# Patient Record
Sex: FEMALE | Race: WHITE | Employment: FULL TIME | URBAN - METROPOLITAN AREA
[De-identification: names, ages, dates, MRNs, and addresses within clinical notes are randomized per-mention and may not be internally consistent; named-entity substitution may affect disease eponyms.]

---

## 2017-02-09 ENCOUNTER — OFFICE VISIT (OUTPATIENT)
Dept: FAMILY MEDICINE | Facility: CLINIC | Age: 52
End: 2017-02-09
Payer: COMMERCIAL

## 2017-02-09 VITALS
SYSTOLIC BLOOD PRESSURE: 114 MMHG | HEART RATE: 70 BPM | TEMPERATURE: 97.9 F | BODY MASS INDEX: 38.87 KG/M2 | WEIGHT: 198 LBS | HEIGHT: 60 IN | DIASTOLIC BLOOD PRESSURE: 67 MMHG

## 2017-02-09 DIAGNOSIS — N30.00 ACUTE CYSTITIS WITHOUT HEMATURIA: Primary | ICD-10-CM

## 2017-02-09 LAB
ALBUMIN UR-MCNC: NEGATIVE MG/DL
APPEARANCE UR: CLEAR
BACTERIA #/AREA URNS HPF: ABNORMAL /HPF
BILIRUB UR QL STRIP: NEGATIVE
COLOR UR AUTO: YELLOW
GLUCOSE UR STRIP-MCNC: NEGATIVE MG/DL
HGB UR QL STRIP: ABNORMAL
KETONES UR STRIP-MCNC: NEGATIVE MG/DL
LEUKOCYTE ESTERASE UR QL STRIP: ABNORMAL
NITRATE UR QL: POSITIVE
NON-SQ EPI CELLS #/AREA URNS LPF: ABNORMAL /LPF
PH UR STRIP: 6 PH (ref 5–7)
RBC #/AREA URNS AUTO: ABNORMAL /HPF (ref 0–2)
SP GR UR STRIP: 1.02 (ref 1–1.03)
URN SPEC COLLECT METH UR: ABNORMAL
UROBILINOGEN UR STRIP-ACNC: 0.2 EU/DL (ref 0.2–1)
WBC #/AREA URNS AUTO: ABNORMAL /HPF (ref 0–2)

## 2017-02-09 PROCEDURE — 99213 OFFICE O/P EST LOW 20 MIN: CPT | Performed by: FAMILY MEDICINE

## 2017-02-09 PROCEDURE — 87088 URINE BACTERIA CULTURE: CPT | Performed by: FAMILY MEDICINE

## 2017-02-09 PROCEDURE — 81001 URINALYSIS AUTO W/SCOPE: CPT | Performed by: FAMILY MEDICINE

## 2017-02-09 PROCEDURE — 87086 URINE CULTURE/COLONY COUNT: CPT | Performed by: FAMILY MEDICINE

## 2017-02-09 PROCEDURE — 87186 SC STD MICRODIL/AGAR DIL: CPT | Performed by: FAMILY MEDICINE

## 2017-02-09 RX ORDER — CIPROFLOXACIN 500 MG/1
500 TABLET, FILM COATED ORAL 2 TIMES DAILY
Qty: 14 TABLET | Refills: 0 | Status: SHIPPED | OUTPATIENT
Start: 2017-02-09 | End: 2017-02-16

## 2017-02-09 NOTE — PROGRESS NOTES
SUBJECTIVE:                                                    Betina Tinsley is a 52 year old female who presents to clinic today for the following health issues:      URINARY TRACT SYMPTOMS     Onset:     Description:   Painful urination (Dysuria): no   Blood in urine (Hematuria): no   Delay in urine (Hesitency): no     Intensity: moderate    Progression of Symptoms:  worsening    Accompanying Signs & Symptoms:  Fever/chills: no   Flank pain no   Nausea and vomiting: no   Any vaginal symptoms: vaginal odor  Abdominal/Pelvic Pain: no    History:   History of frequent UTI's: no   In November or December, 2016 there was a UTI. Septra and one other med was given, possibly Cipro. Neither of these cleared this up.   She has urinary stress incontinence.    History of kidney stones: no   Sexually Active: YES  Possibility of pregnancy: No    Precipitating factors:   She is a  and stands.          Therapies Tried and outcome: Advil, increased fluid intake         Current outpatient prescriptions:      LamoTRIgine (LAMICTAL PO), Take 150 mg by mouth daily, Disp: , Rfl:      BusPIRone HCl (BUSPAR PO), Take 30 mg by mouth 2 times daily, Disp: , Rfl:      Escitalopram Oxalate (LEXAPRO PO), Take 20 mg by mouth daily, Disp: , Rfl:      ATENOLOL PO, Take 100 mg by mouth daily, Disp: , Rfl:      Acetaminophen-Caffeine (EXCEDRIN QUICKTABS PO), Take 1-2 tablets by mouth at onset of headache.  , Disp: , Rfl:      Nutritional Supplements (MELATONIN PO), Take 5 mg by mouth At Bedtime.  , Disp: , Rfl:      Tolterodine Tartrate (DETROL LA PO), Take 2 mg by mouth daily , Disp: , Rfl:      Zolpidem Tartrate (AMBIEN PO), Take 5-10 mg by mouth nightly as needed.  , Disp: , Rfl:      AMITRIPTYLINE HCL 50 MG PO TABS, 1 TABLET AT BEDTIME, Disp: , Rfl:      meloxicam (MOBIC) 7.5 MG tablet, Take 1 tablet (7.5 mg) by mouth daily, Disp: 30 tablet, Rfl: 1     order for DME, Dynaflex insert, Disp: 1 Units, Rfl: 0     LORazepam (ATIVAN PO),  Take 0.5 mg by mouth daily as needed for anxiety, Disp: , Rfl:      OMEPRAZOLE PO, Take 20 mg by mouth daily, Disp: , Rfl:      mometasone (NASONEX) 50 MCG/ACT nasal spray, Spray 2 sprays into both nostrils daily, Disp: , Rfl:     There is no problem list on file for this patient.      Blood pressure 114/67, pulse 70, temperature 97.9  F (36.6  C), temperature source Tympanic, height 5' (1.524 m), weight 198 lb (89.812 kg).  Exam:  GENERAL APPEARANCE: healthy, alert and no distress  ABDOMEN:  soft, nontender, no HSM or masses and bowel sounds normal  SKIN: no suspicious lesions or rashes  PSYCH: mentation appears normal and affect normal/bright    UA: abnormal with a UTI      (N30.00) Acute cystitis without hematuria  (primary encounter diagnosis)  Comment:   Plan: *UA reflex to Microscopic and Culture         ([facility] and [facility] (except         [place] and [place]), Urine Microscopic,         Urine Culture Aerobic Bacterial, ciprofloxacin         (CIPRO) 500 MG tablet, US Abdomen Limited        Start Cipro today at 500 mg twice daily for 7 days. Stay well hydrated. Monitor for resolution. If worse in any way with fever and back pain and blood in the urine then call or return.   The culture will be done on Saturday. We will call the results and if needed, they can be obtained over the weekend. Once better then call [phone] to schedule the ultrasound of the bladder. If this is normal and there are any future infections then a referral to Urology would be done.     Jono Polanco

## 2017-02-09 NOTE — PATIENT INSTRUCTIONS
(N30.00) Acute cystitis without hematuria  (primary encounter diagnosis)  Comment:   Plan: *UA reflex to Microscopic and Culture         ([facility] and [facility] (except         [place] and [place]), Urine Microscopic,         Urine Culture Aerobic Bacterial, ciprofloxacin         (CIPRO) 500 MG tablet, US Abdomen Limited        Start Cipro today at 500 mg twice daily for 7 days. Stay well hydrated. Monitor for resolution. If worse in any way with fever and back pain and blood in the urine then call or return.   The culture will be done on Saturday. We will call the results and if needed, they can be obtained over the weekend. Once better then call [phone] to schedule the ultrasound of the bladder. If this is normal and there are any future infections then a referral to Urology would be done.

## 2017-02-09 NOTE — NURSING NOTE
Chief Complaint   Patient presents with     Vaginal Problem     UTI       Initial /67 mmHg  Pulse 70  Temp(Src) 97.9  F (36.6  C) (Tympanic)  Ht 5' (1.524 m)  Wt 198 lb (89.812 kg)  BMI 38.67 kg/m2 Estimated body mass index is 38.67 kg/(m^2) as calculated from the following:    Height as of this encounter: 5' (1.524 m).    Weight as of this encounter: 198 lb (89.812 kg).  Medication Reconciliation: complete

## 2017-02-09 NOTE — MR AVS SNAPSHOT
After Visit Summary   2/9/2017    Betina Tinsley    MRN: [id number]           Patient Information     Date Of Birth          1965        Visit Information        Provider Department      2/9/2017 2:40 PM Jono Polanco MD [facility]        Today's Diagnoses     Acute cystitis without hematuria    -  1       Care Instructions    (N30.00) Acute cystitis without hematuria  (primary encounter diagnosis)  Comment:   Plan: *UA reflex to Microscopic and Culture         ([facility] and [facility] (except         [place] and [place]), Urine Microscopic,         Urine Culture Aerobic Bacterial, ciprofloxacin         (CIPRO) 500 MG tablet, US Abdomen Limited        Start Cipro today at 500 mg twice daily for 7 days. Stay well hydrated. Monitor for resolution. If worse in any way with fever and back pain and blood in the urine then call or return.   The culture will be done on Saturday. We will call the results and if needed, they can be obtained over the weekend. Once better then call [phone] to schedule the ultrasound of the bladder. If this is normal and there are any future infections then a referral to Urology would be done.         Follow-ups after your visit        Future tests that were ordered for you today     Open Future Orders        Priority Expected Expires Ordered    US Abdomen Limited Routine  2/9/2018 2/9/2017            Who to contact     If you have questions or need follow up information about today's clinic visit or your schedule please contact [facility] directly at [phone].  Normal or non-critical lab and imaging results will be communicated to you by MyChart, letter or phone within 4 business days after the clinic has received the results. If you do not hear from us within 7 days, please contact the clinic through MyChart or phone. If you have a critical or abnormal lab result, we will notify you by phone as soon as  "possible.  Submit refill requests through [employer] or call your pharmacy and they will forward the refill request to us. Please allow 3 business days for your refill to be completed.          Additional Information About Your Visit        [employer] Information     [employer] lets you send messages to your doctor, view your test results, renew your prescriptions, schedule appointments and more. To sign up, go to [email or web address] . Click on \"Log in\" on the left side of the screen, which will take you to the Welcome page. Then click on \"Sign up Now\" on the right side of the page.     You will be asked to enter the access code listed below, as well as some personal information. Please follow the directions to create your username and password.     Your access code is: [email or web address]  Expires: 5/10/2017  3:28 PM     Your access code will  in 90 days. If you need help or a new code, please call your [place] clinic or [phone].        Care EveryWhere ID     This is your Care EveryWhere ID. This could be used by other organizations to access your [facility] medical records  [id number]        Your Vitals Were     Pulse Temperature Height BMI (Body Mass Index)          70 97.9  F (36.6  C) (Tympanic) 5' (1.524 m) 38.67 kg/m2         Blood Pressure from Last 3 Encounters:   17 114/67   16 101/67   14 118/79    Weight from Last 3 Encounters:   17 198 lb (89.812 kg)   16 207 lb (93.895 kg)   14 200 lb (90.719 kg)              We Performed the Following     *UA reflex to Microscopic and Culture ([facility] and [facility] (except [place] and [place])     Urine Culture Aerobic Bacterial     Urine Microscopic          Today's Medication Changes          These changes are accurate as of: 17  3:28 PM.  If you have any questions, ask your nurse or doctor.               Start taking these medicines.        Dose/Directions    ciprofloxacin 500 MG tablet   Commonly known " as:  CIPRO   Used for:  Acute cystitis without hematuria   Started by:  Jono Polanco MD        Dose:  500 mg   Take 1 tablet (500 mg) by mouth 2 times daily for 7 days   Quantity:  14 tablet   Refills:  0            Where to get your medicines      These medications were sent to [facility] [address] [place] [employer] [facility] [address] [facility] [address]  [address] [facility] [address]     Phone:  [phone]    - ciprofloxacin 500 MG tablet             Primary Care Provider Office Phone # Fax #    Gwendolyn Kenna Nielsen [phone]       [facility] [address] [facility] [address]        Thank you!     Thank you for choosing [facility]  for your care. Our goal is always to provide you with excellent care. Hearing back from our patients is one way we can continue to improve our services. Please take a few minutes to complete the written survey that you may receive in the mail after your visit with us. Thank you!             Your Updated Medication List - Protect others around you: Learn how to safely use, store and throw away your medicines at www.disposemymeds.org.          This list is accurate as of: 2/9/17  3:28 PM.  Always use your most recent med list.                   Brand Name Dispense Instructions for use    AMBIEN PO      Take 5-10 mg by mouth nightly as needed.       amitriptyline 50 MG tablet    ELAVIL     1 TABLET AT BEDTIME       ATENOLOL PO      Take 100 mg by mouth daily       ATIVAN PO      Take 0.5 mg by mouth daily as needed for anxiety       BUSPAR PO      Take 30 mg by mouth 2 times daily       ciprofloxacin 500 MG tablet    CIPRO    14 tablet    Take 1 tablet (500 mg) by mouth 2 times daily for 7 days       DETROL LA PO      Take 2 mg by mouth daily       EXCEDRIN QUICKTABS PO      Take 1-2 tablets by mouth at onset of headache.       LAMICTAL PO      Take 150 mg by mouth daily       LEXAPRO PO      Take 20 mg by mouth daily       MELATONIN PO       Take 5 mg by mouth At Bedtime.       meloxicam 7.5 MG tablet    MOBIC    30 tablet    Take 1 tablet (7.5 mg) by mouth daily       mometasone 50 MCG/ACT spray    NASONEX     Spray 2 sprays into both nostrils daily       OMEPRAZOLE PO      Take 20 mg by mouth daily       order for DME     1 Units    Dynaflex insert

## 2017-02-11 LAB
BACTERIA SPEC CULT: ABNORMAL
MICRO REPORT STATUS: ABNORMAL
MICROORGANISM SPEC CULT: ABNORMAL
SPECIMEN SOURCE: ABNORMAL

## 2017-03-13 ENCOUNTER — OFFICE VISIT (OUTPATIENT)
Dept: FAMILY MEDICINE | Facility: CLINIC | Age: 52
End: 2017-03-13
Payer: COMMERCIAL

## 2017-03-13 VITALS
SYSTOLIC BLOOD PRESSURE: 96 MMHG | HEART RATE: 68 BPM | TEMPERATURE: 98.8 F | BODY MASS INDEX: 38.9 KG/M2 | DIASTOLIC BLOOD PRESSURE: 66 MMHG | WEIGHT: 199.2 LBS

## 2017-03-13 DIAGNOSIS — R35.0 URINARY FREQUENCY: ICD-10-CM

## 2017-03-13 DIAGNOSIS — R00.8 TRIGEMINY: ICD-10-CM

## 2017-03-13 DIAGNOSIS — I10 BENIGN ESSENTIAL HYPERTENSION: ICD-10-CM

## 2017-03-13 DIAGNOSIS — E78.2 MIXED HYPERLIPIDEMIA: ICD-10-CM

## 2017-03-13 DIAGNOSIS — N39.46 MIXED INCONTINENCE: Primary | ICD-10-CM

## 2017-03-13 DIAGNOSIS — Z12.31 VISIT FOR SCREENING MAMMOGRAM: ICD-10-CM

## 2017-03-13 PROBLEM — R32 URINARY INCONTINENCE: Status: ACTIVE | Noted: 2017-03-13

## 2017-03-13 LAB
ALBUMIN UR-MCNC: NEGATIVE MG/DL
APPEARANCE UR: CLEAR
BACTERIA #/AREA URNS HPF: ABNORMAL /HPF
BILIRUB UR QL STRIP: NEGATIVE
COLOR UR AUTO: YELLOW
GLUCOSE UR STRIP-MCNC: NEGATIVE MG/DL
HGB UR QL STRIP: NEGATIVE
KETONES UR STRIP-MCNC: NEGATIVE MG/DL
LEUKOCYTE ESTERASE UR QL STRIP: ABNORMAL
NITRATE UR QL: NEGATIVE
NON-SQ EPI CELLS #/AREA URNS LPF: ABNORMAL /LPF
PH UR STRIP: 6.5 PH (ref 5–7)
RBC #/AREA URNS AUTO: ABNORMAL /HPF (ref 0–2)
SP GR UR STRIP: 1.02 (ref 1–1.03)
URN SPEC COLLECT METH UR: ABNORMAL
UROBILINOGEN UR STRIP-ACNC: 0.2 EU/DL (ref 0.2–1)
WBC #/AREA URNS AUTO: ABNORMAL /HPF (ref 0–2)

## 2017-03-13 PROCEDURE — 99214 OFFICE O/P EST MOD 30 MIN: CPT | Performed by: NURSE PRACTITIONER

## 2017-03-13 PROCEDURE — 87086 URINE CULTURE/COLONY COUNT: CPT | Performed by: NURSE PRACTITIONER

## 2017-03-13 PROCEDURE — 81001 URINALYSIS AUTO W/SCOPE: CPT | Performed by: NURSE PRACTITIONER

## 2017-03-13 RX ORDER — ATENOLOL 100 MG/1
100 TABLET ORAL DAILY
Qty: 90 TABLET | Refills: 1 | Status: SHIPPED | OUTPATIENT
Start: 2017-03-13 | End: 2017-08-16

## 2017-03-13 RX ORDER — TOLTERODINE 2 MG/1
2 CAPSULE, EXTENDED RELEASE ORAL DAILY
Qty: 90 CAPSULE | Refills: 3 | Status: SHIPPED | OUTPATIENT
Start: 2017-03-13 | End: 2018-05-04

## 2017-03-13 ASSESSMENT — ENCOUNTER SYMPTOMS
DIARRHEA: 0
LIGHT-HEADEDNESS: 0
VOMITING: 0
HEADACHES: 0
WHEEZING: 0
ABDOMINAL DISTENTION: 0
NAUSEA: 0
DIZZINESS: 0
PALPITATIONS: 1
CHEST TIGHTNESS: 1
ABDOMINAL PAIN: 0
FATIGUE: 0
NUMBNESS: 0
MYALGIAS: 0
SHORTNESS OF BREATH: 0
RHINORRHEA: 0
SORE THROAT: 0
CONSTIPATION: 0
COUGH: 0
ARTHRALGIAS: 0
NERVOUS/ANXIOUS: 1

## 2017-03-13 NOTE — MR AVS SNAPSHOT
After Visit Summary   3/13/2017    Betina Tinsley    MRN: [id number]           Patient Information     Date Of Birth          1965        Visit Information        Provider Department      3/13/2017 10:40 AM Madeleine Wells APRN [facility]        Today's Diagnoses     Urinary incontinence    -  1    Visit for screening mammogram        Benign essential hypertension        Mixed hyperlipidemia        Trigeminy        Urinary frequency          Care Instructions    Make a return lab appointment in mid April for fasting labs when you have not had anything to eat for 8 hours prior and the only thing to drink is water.     Plan to follow up in 6 mo or sooner if needed     These are general instructions and may not be specific to you. Please call, email or follow up if you have any questions or concerns.     Established High Blood Pressure    High blood pressure (hypertension) is a chronic disease. Often health care providers don t know what causes it. But it can be caused by certain health conditions and medicines.  If you have high blood pressure, you may not have any symptoms. If you do have symptoms, they may include headache, dizziness, changes in your vision, chest pain, and shortness of breath. But even without symptoms, high blood pressure that s not treated raises your risk for heart attack and stroke. High blood pressure is a serious health risk and shouldn t be ignored.  A blood pressure reading is made up of two numbers: a higher number over a lower number. The top number is the systolic pressure. The bottom number is the diastolic pressure. A normal blood pressure is less than 120 over less than 80.  High blood pressure is when either the top number is 140 or higher, or the bottom number is 90 or higher. This must be the result when taking your blood pressure a number of times. The blood pressures between normal and high are called prehypertension.  Home care  If  you have high blood pressure, you should do what is listed below to lower your blood pressure. If you are taking medicines for high blood pressure, these methods may reduce or end your need for medicines in the future.    Begin a weight-loss program if you are overweight.    Cut back on how much salt you get in your diet. Here s how to do this:    Don t eat foods that have a lot of salt. These include olives, pickles, smoked meats, and salted potato chips.    Don t add salt to your food at the table.    Use only small amounts of salt when cooking.    Begin an exercise program. Talk with your health care provider about the type of exercise program that would be best for you. It doesn't have to be hard. Even brisk walking for 20 minutes 3 times a week is a good form of exercise.    Don t take medicines that have heart stimulants. This includes many cold and sinus decongestant pills and sprays, as well as diet pills. Check the warnings about hypertension on the label. Stimulants such as amphetamine or cocaine could be lethal for someone with high blood pressure. Never take these.    Limit how much caffeine you get in your diet. Switch to caffeine-free products.    Stop smoking. If you are a long-time smoker, this can be hard. Enroll in a stop-smoking program to make it more likely that you will quit for good.    Learn how to handle stress. This is an important part of any program to lower blood pressure. Learn about relaxation methods like meditation, yoga, or biofeedback.    If your provider prescribed medicines, take them exactly as directed. Missing doses may cause your blood pressure get out of control.    Consider buying an automatic blood pressure machine. You can get one of these at most pharmacies. Use this to watch your blood pressure at home. Give the results to your provider.  Follow-up care  You will need to make regular visits to your health care provider. This is to check your blood pressure and to make  changes to your medicines. Make a follow-up appointment as directed.  When to seek medical advice  Call your health care provider right away if any of these occur:    Chest pain or shortness of breath    Severe headache    Throbbing or rushing sound in the ears    Nosebleed    Sudden severe pain in your belly (abdomen)    Extreme drowsiness, confusion, or fainting    Dizziness or dizziness with a spinning sensation (vertigo)    Weakness of an arm or leg or one side of the face    You have problems speaking or seeing     6611-8308 [employer]. [address] [place] [address]. All rights reserved. This information is not intended as a substitute for professional medical care. Always follow your healthcare professional's instructions.              Follow-ups after your visit        Future tests that were ordered for you today     Open Future Orders        Priority Expected Expires Ordered    MA Screening Digital Bilateral Routine  3/13/2018 3/13/2017    Lipid panel reflex to direct LDL Routine  3/13/2018 3/13/2017            Who to contact     Normal or non-critical lab and imaging results will be communicated to you by [employer], letter or phone within 4 business days after the clinic has received the results. If you do not hear from us within 7 days, please contact the clinic through [employer] or phone. If you have a critical or abnormal lab result, we will notify you by phone as soon as possible.  Submit refill requests through [employer] or call your pharmacy and they will forward the refill request to us. Please allow 3 business days for your refill to be completed.          If you need to speak with a  for additional information , please call: [phone]           Additional Information About Your Visit        [employer] Information     [employer] lets you send messages to your doctor, view your test results, renew your prescriptions, schedule appointments and more. To sign up, go to  [email or web address] . Click on \"Log in\" on the left side of the screen, which will take you to the Welcome page. Then click on \"Sign up Now\" on the right side of the page.     You will be asked to enter the access code listed below, as well as some personal information. Please follow the directions to create your username and password.     Your access code is: [email or web address]  Expires: 5/10/2017  4:28 PM     Your access code will  in 90 days. If you need help or a new code, please call your [place] clinic or [phone].        Care EveryWhere ID     This is your Care EveryWhere ID. This could be used by other organizations to access your [place] medical records  [id number]        Your Vitals Were     Pulse Temperature Last Period BMI (Body Mass Index)          68 98.8  F (37.1  C) (Tympanic) 2016 38.9 kg/m2         Blood Pressure from Last 3 Encounters:   17 96/66   17 114/67   16 101/67    Weight from Last 3 Encounters:   17 199 lb 3.2 oz (90.4 kg)   17 198 lb (89.8 kg)   16 207 lb (93.9 kg)              We Performed the Following     UA reflex to Microscopic and Culture     Urine Microscopic          Today's Medication Changes          These changes are accurate as of: 3/13/17 11:30 AM.  If you have any questions, ask your nurse or doctor.               These medicines have changed or have updated prescriptions.        Dose/Directions    atenolol 100 MG tablet   Commonly known as:  TENORMIN   This may have changed:  medication strength   Used for:  Benign essential hypertension, Trigeminy   Changed by:  Madeleine Wells, ROBBIN CNP        Dose:  100 mg   Take 1 tablet (100 mg) by mouth daily   Quantity:  90 tablet   Refills:  1            Where to get your medicines      These medications were sent to [facility] [address] [place] [facility] [address] [facility]  [address] [facility] [address]     Phone:  [phone]     atenolol 100 MG tablet    " tolterodine 2 MG 24 hr capsule                Primary Care Provider Office Phone # Fax #    Gwendolyn Kenna Nielsen [phone]       [place] [id number] [facility] [address]        Thank you!     Thank you for choosing [facility]  for your care. Our goal is always to provide you with excellent care. Hearing back from our patients is one way we can continue to improve our services. Please take a few minutes to complete the written survey that you may receive in the mail after your visit with us. Thank you!             Your Updated Medication List - Protect others around you: Learn how to safely use, store and throw away your medicines at www.disposemymeds.org.          This list is accurate as of: 3/13/17 11:30 AM.  Always use your most recent med list.                   Brand Name Dispense Instructions for use    AMBIEN PO      Take 5-10 mg by mouth nightly as needed.       amitriptyline 50 MG tablet    ELAVIL     1 TABLET AT BEDTIME       atenolol 100 MG tablet    TENORMIN    90 tablet    Take 1 tablet (100 mg) by mouth daily       ATIVAN PO      Take 0.5 mg by mouth daily as needed for anxiety       BUSPAR PO      Take 30 mg by mouth 2 times daily       EXCEDRIN QUICKTABS PO      Take 1-2 tablets by mouth at onset of headache.       LAMICTAL PO      Take 150 mg by mouth daily       LEXAPRO PO      Take 20 mg by mouth daily       MELATONIN PO      Take 5 mg by mouth At Bedtime.       mometasone 50 MCG/ACT spray    NASONEX     Spray 2 sprays into both nostrils daily       OMEPRAZOLE PO      Take 20 mg by mouth daily       tolterodine 2 MG 24 hr capsule    DETROL LA    90 capsule    Take 1 capsule (2 mg) by mouth daily

## 2017-03-13 NOTE — NURSING NOTE
Chief Complaint   Patient presents with     Hypertension       Initial BP 96/66 (BP Location: Right arm, Patient Position: Chair, Cuff Size: Adult Large)  Pulse 68  Temp 98.8  F (37.1  C) (Tympanic)  Wt 199 lb 3.2 oz (90.4 kg)  LMP 11/30/2016  BMI 38.9 kg/m2 Estimated body mass index is 38.9 kg/(m^2) as calculated from the following:    Height as of 2/9/17: 5' (1.524 m).    Weight as of this encounter: 199 lb 3.2 oz (90.4 kg).  Medication Reconciliation: complete     April CORAZON Amador

## 2017-03-13 NOTE — PATIENT INSTRUCTIONS
Make a return lab appointment in mid April for fasting labs when you have not had anything to eat for 8 hours prior and the only thing to drink is water.     Plan to follow up in 6 mo or sooner if needed     These are general instructions and may not be specific to you. Please call, email or follow up if you have any questions or concerns.     Established High Blood Pressure    High blood pressure (hypertension) is a chronic disease. Often health care providers don t know what causes it. But it can be caused by certain health conditions and medicines.  If you have high blood pressure, you may not have any symptoms. If you do have symptoms, they may include headache, dizziness, changes in your vision, chest pain, and shortness of breath. But even without symptoms, high blood pressure that s not treated raises your risk for heart attack and stroke. High blood pressure is a serious health risk and shouldn t be ignored.  A blood pressure reading is made up of two numbers: a higher number over a lower number. The top number is the systolic pressure. The bottom number is the diastolic pressure. A normal blood pressure is less than 120 over less than 80.  High blood pressure is when either the top number is 140 or higher, or the bottom number is 90 or higher. This must be the result when taking your blood pressure a number of times. The blood pressures between normal and high are called prehypertension.  Home care  If you have high blood pressure, you should do what is listed below to lower your blood pressure. If you are taking medicines for high blood pressure, these methods may reduce or end your need for medicines in the future.    Begin a weight-loss program if you are overweight.    Cut back on how much salt you get in your diet. Here s how to do this:    Don t eat foods that have a lot of salt. These include olives, pickles, smoked meats, and salted potato chips.    Don t add salt to your food at the table.    Use  only small amounts of salt when cooking.    Begin an exercise program. Talk with your health care provider about the type of exercise program that would be best for you. It doesn't have to be hard. Even brisk walking for 20 minutes 3 times a week is a good form of exercise.    Don t take medicines that have heart stimulants. This includes many cold and sinus decongestant pills and sprays, as well as diet pills. Check the warnings about hypertension on the label. Stimulants such as amphetamine or cocaine could be lethal for someone with high blood pressure. Never take these.    Limit how much caffeine you get in your diet. Switch to caffeine-free products.    Stop smoking. If you are a long-time smoker, this can be hard. Enroll in a stop-smoking program to make it more likely that you will quit for good.    Learn how to handle stress. This is an important part of any program to lower blood pressure. Learn about relaxation methods like meditation, yoga, or biofeedback.    If your provider prescribed medicines, take them exactly as directed. Missing doses may cause your blood pressure get out of control.    Consider buying an automatic blood pressure machine. You can get one of these at most pharmacies. Use this to watch your blood pressure at home. Give the results to your provider.  Follow-up care  You will need to make regular visits to your health care provider. This is to check your blood pressure and to make changes to your medicines. Make a follow-up appointment as directed.  When to seek medical advice  Call your health care provider right away if any of these occur:    Chest pain or shortness of breath    Severe headache    Throbbing or rushing sound in the ears    Nosebleed    Sudden severe pain in your belly (abdomen)    Extreme drowsiness, confusion, or fainting    Dizziness or dizziness with a spinning sensation (vertigo)    Weakness of an arm or leg or one side of the face    You have problems speaking or  seeing     6660-3793 [employer]. [address] [place] [address], [place] [address]. All rights reserved. This information is not intended as a substitute for professional medical care. Always follow your healthcare professional's instructions.

## 2017-03-13 NOTE — PROGRESS NOTES
SUBJECTIVE:                                                    Betina Tinsley is a 52 year old female who presents to clinic today for the following health issues:    Here today to establish care. Started job at [employer]. Has anxiety some at work but is well controlled when takes buspar. Usually only takes the buspar as needed. Goes to [facility] for anxiety. Does feel like mouth is always dry     Does have high cholesterol. Has been trying to decrease butter in diet. Is more active since started working at [employer].     Has been taking detrol for about 5 years. Takes Detrol for urinary frequency. Does feel like it helps with that.   Does have stress incontinenance.     Needs to get set up for mammogram. Does self breast exams at home without area of concern.     Does have 1 child that is 26 that lives in [place]. Daughter is bipolar.      Last Pap: Nov 2015-normal. Did have an abnormal 1998 and that cleared on its own. Periods only come 3-4 months   Last mammogram: 2015 normal. Sister did have breast cancer.   Last BMD: Never   Last Colonoscopy: 2014-did have 1 polyp. Father had suspicious looking polys.   Last eye exam: yearly. Keransis, fukes disease, dry eyes  Last dental exam: Every 6 mo.   Last tetanus vaccine: 11/11  Last influenza vaccine: Fall   Last shingles vaccine: Never   Last pneumonia vaccine: 9/10-Prevnar 23      Hypertension Follow-up      Outpatient blood pressures are not being checked.    Low Salt Diet: low salt       Amount of exercise or physical activity: works as a , takes her dog for walks    Problems taking medications regularly: No    Medication side effects: none  Diet: regular (no restrictions)    URINARY TRACT SYMPTOMS      Duration: several years    Description  incontinence    Intensity:  mild    Accompanying signs and symptoms:  Fever/chills: no   Flank pain no   Nausea and vomiting: no   Vaginal symptoms: none  Abdominal/Pelvic Pain: no     History  History of  frequent UTI's: two UTI's since November  History of kidney stones: YES  Sexually Active: no   Possibility of pregnancy: No    Precipitating or alleviating factors: None    Therapies tried and outcome: prescribed Cipro for 7 days at office visit 2/9/17 for acute cystitis, she has been taking Tolterodine Tartrate for urinary frequency for years         Problem list and histories reviewed & adjusted, as indicated.  Additional history: as documented    Current Outpatient Prescriptions   Medication Sig Dispense Refill     tolterodine (DETROL LA) 2 MG 24 hr capsule Take 1 capsule (2 mg) by mouth daily 90 capsule 3     atenolol (TENORMIN) 100 MG tablet Take 1 tablet (100 mg) by mouth daily 90 tablet 1     LamoTRIgine (LAMICTAL PO) Take 150 mg by mouth daily       LORazepam (ATIVAN PO) Take 0.5 mg by mouth daily as needed for anxiety       BusPIRone HCl (BUSPAR PO) Take 30 mg by mouth 2 times daily       Escitalopram Oxalate (LEXAPRO PO) Take 20 mg by mouth daily       OMEPRAZOLE PO Take 20 mg by mouth daily       mometasone (NASONEX) 50 MCG/ACT nasal spray Spray 2 sprays into both nostrils daily       Acetaminophen-Caffeine (EXCEDRIN QUICKTABS PO) Take 1-2 tablets by mouth at onset of headache.         Nutritional Supplements (MELATONIN PO) Take 5 mg by mouth At Bedtime.         Zolpidem Tartrate (AMBIEN PO) Take 5-10 mg by mouth nightly as needed.         AMITRIPTYLINE HCL 50 MG PO TABS 1 TABLET AT BEDTIME       [DISCONTINUED] ATENOLOL PO Take 100 mg by mouth daily       Allergies   Allergen Reactions     Hydromorphone Rash       Reviewed and updated as needed this visit by clinical staff  Tobacco  Allergies  Meds  Med Hx  Surg Hx  Fam Hx  Soc Hx      Reviewed and updated as needed this visit by Provider         ROS:  Review of Systems   Constitutional: Negative for fatigue.   HENT: Negative for ear pain, rhinorrhea and sore throat.    Eyes: Negative for visual disturbance.   Respiratory: Positive for chest  tightness (with anxiety ). Negative for cough, shortness of breath and wheezing.    Cardiovascular: Positive for palpitations (has had trigeminy in the past and that is the reason for the atenolol. ). Negative for chest pain and leg swelling.   Gastrointestinal: Negative for abdominal distention, abdominal pain, constipation, diarrhea, nausea and vomiting.   Endocrine: Negative for cold intolerance and heat intolerance.   Musculoskeletal: Negative for arthralgias and myalgias.   Skin: Negative for rash.   Neurological: Negative for dizziness, light-headedness, numbness and headaches.   Psychiatric/Behavioral: The patient is nervous/anxious.          OBJECTIVE:                                                    BP 96/66 (BP Location: Right arm, Patient Position: Chair, Cuff Size: Adult Large)  Pulse 68  Temp 98.8  F (37.1  C) (Tympanic)  Wt 199 lb 3.2 oz (90.4 kg)  LMP 11/30/2016  BMI 38.9 kg/m2  Body mass index is 38.9 kg/(m^2).  Physical Exam   Constitutional: She appears well-developed and well-nourished.   HENT:   Head: Normocephalic and atraumatic.   Right Ear: Tympanic membrane and external ear normal. No middle ear effusion.   Left Ear: Tympanic membrane and external ear normal.  No middle ear effusion.   Nose: No mucosal edema.   Mouth/Throat: Oropharynx is clear and moist and mucous membranes are normal.   Neck: Carotid bruit is not present. No thyromegaly present.   Cardiovascular: Normal rate, regular rhythm and normal heart sounds.    Pulmonary/Chest: Effort normal and breath sounds normal.   Abdominal: Soft. Normal appearance and bowel sounds are normal.   Neurological: She is alert.   Skin: Skin is warm and dry.   Psychiatric: She has a normal mood and affect. Her behavior is normal.       Diagnostic Test Results:  none      ASSESSMENT/PLAN:                                                    1. Urinary incontinence  Will send urine for culture and will treat when returns if indicated.   - UA reflex to  Microscopic and Culture  - Urine Microscopic    2. Visit for screening mammogram  Order placed, plans to call per self and set up    - MA Screening Digital Bilateral; Future    3. Benign essential hypertension  Risk and potential complications of hypertension were reviewed with the patient  The patient understands that their hypertension in under goodcontrol currently  We reviewed the importance of medication compliance and regular follow-up  Lifestyle modification was encouraged  Encouraged to call or return:  With any chest pain or discomfort  Any shortness of breath or swelling of ankles   Headaches not relieved with over the counter meds  Any new or unexplained symptoms   Plan to follow up in 6 months  - atenolol (TENORMIN) 100 MG tablet; Take 1 tablet (100 mg) by mouth daily  Dispense: 90 tablet; Refill: 1    4. Mixed hyperlipidemia  Return for fasting labs in April  - Lipid panel reflex to direct LDL; Future    5. Trigeminy  Doing well on atenolol plan to continue   - atenolol (TENORMIN) 100 MG tablet; Take 1 tablet (100 mg) by mouth daily  Dispense: 90 tablet; Refill: 1    6. Urinary frequency  Tolerating detrol. Has been on for some time. Plan to continue   - tolterodine (DETROL LA) 2 MG 24 hr capsule; Take 1 capsule (2 mg) by mouth daily  Dispense: 90 capsule; Refill: 3      ROBBIN Delcid [facility]

## 2017-03-15 LAB
BACTERIA SPEC CULT: NORMAL
MICRO REPORT STATUS: NORMAL
SPECIMEN SOURCE: NORMAL

## 2017-04-13 ENCOUNTER — TELEPHONE (OUTPATIENT)
Dept: OTHER | Facility: CLINIC | Age: 52
End: 2017-04-13

## 2017-04-13 NOTE — TELEPHONE ENCOUNTER
4/13/2017    Call Regarding Onboarding [employer] choices silver     Attempt 1    Message on voicemail     Comments: No Dep      Outreach   cnt

## 2017-04-17 ENCOUNTER — TELEPHONE (OUTPATIENT)
Dept: FAMILY MEDICINE | Facility: CLINIC | Age: 52
End: 2017-04-17

## 2017-04-17 NOTE — TELEPHONE ENCOUNTER
Please abstract the following data from this visit with this patient into the appropriate field in Epic:    Pap smear done on this date: 10/23/15 (approximately), by this group: [facility], results were NIL.     Pamela Amador CMA

## 2017-05-31 ENCOUNTER — OFFICE VISIT (OUTPATIENT)
Dept: DERMATOLOGY | Facility: CLINIC | Age: 52
End: 2017-05-31
Payer: COMMERCIAL

## 2017-05-31 ENCOUNTER — HOSPITAL ENCOUNTER (OUTPATIENT)
Dept: MAMMOGRAPHY | Facility: CLINIC | Age: 52
Discharge: HOME | End: 2017-05-31
Attending: NURSE PRACTITIONER | Admitting: NURSE PRACTITIONER
Payer: COMMERCIAL

## 2017-05-31 VITALS — SYSTOLIC BLOOD PRESSURE: 118 MMHG | HEART RATE: 67 BPM | OXYGEN SATURATION: 97 % | DIASTOLIC BLOOD PRESSURE: 68 MMHG

## 2017-05-31 DIAGNOSIS — L81.4 LENTIGO: ICD-10-CM

## 2017-05-31 DIAGNOSIS — L82.1 SK (SEBORRHEIC KERATOSIS): ICD-10-CM

## 2017-05-31 DIAGNOSIS — E78.5 HYPERLIPIDEMIA LDL GOAL <100: Primary | ICD-10-CM

## 2017-05-31 DIAGNOSIS — Z12.31 VISIT FOR SCREENING MAMMOGRAM: ICD-10-CM

## 2017-05-31 DIAGNOSIS — E78.2 MIXED HYPERLIPIDEMIA: ICD-10-CM

## 2017-05-31 DIAGNOSIS — L21.9 DERMATITIS, SEBORRHEIC: ICD-10-CM

## 2017-05-31 DIAGNOSIS — L73.8 SENILE SEBACEOUS GLAND HYPERPLASIA: Primary | ICD-10-CM

## 2017-05-31 LAB
CHOLEST SERPL-MCNC: 245 MG/DL
HDLC SERPL-MCNC: 50 MG/DL
LDLC SERPL CALC-MCNC: 166 MG/DL
NONHDLC SERPL-MCNC: 195 MG/DL
TRIGL SERPL-MCNC: 147 MG/DL

## 2017-05-31 PROCEDURE — 77063 BREAST TOMOSYNTHESIS BI: CPT

## 2017-05-31 PROCEDURE — 80061 LIPID PANEL: CPT | Performed by: NURSE PRACTITIONER

## 2017-05-31 PROCEDURE — 99203 OFFICE O/P NEW LOW 30 MIN: CPT | Performed by: DERMATOLOGY

## 2017-05-31 PROCEDURE — 36415 COLL VENOUS BLD VENIPUNCTURE: CPT | Performed by: NURSE PRACTITIONER

## 2017-05-31 PROCEDURE — G0202 SCR MAMMO BI INCL CAD: HCPCS

## 2017-05-31 RX ORDER — CICLOPIROX OLAMINE 7.7 MG/G
CREAM TOPICAL AT BEDTIME
Qty: 90 G | Refills: 3 | Status: SHIPPED | OUTPATIENT
Start: 2017-05-31

## 2017-05-31 RX ORDER — SIMVASTATIN 10 MG
10 TABLET ORAL AT BEDTIME
Qty: 90 TABLET | Refills: 0 | Status: SHIPPED | OUTPATIENT
Start: 2017-05-31 | End: 2017-08-27

## 2017-05-31 RX ORDER — KETOCONAZOLE 20 MG/ML
SHAMPOO TOPICAL
Qty: 240 ML | Refills: 11 | Status: SHIPPED | OUTPATIENT
Start: 2017-05-31

## 2017-05-31 RX ORDER — DESONIDE 0.5 MG/G
CREAM TOPICAL
Qty: 60 G | Refills: 3 | Status: SHIPPED | OUTPATIENT
Start: 2017-05-31

## 2017-05-31 NOTE — NURSING NOTE
Initial /68  Pulse 67  SpO2 97% Estimated body mass index is 38.9 kg/(m^2) as calculated from the following:    Height as of 2/9/17: 1.524 m (5').    Weight as of 3/13/17: 90.4 kg (199 lb 3.2 oz). .

## 2017-05-31 NOTE — PROGRESS NOTES
Betina,     Your mammogram is normal. Plan to repeat in 1 year.    Please call or email with any additional questions or concerns  ROBBIN Ansari CNP

## 2017-05-31 NOTE — LETTER
[facility]  [address] [place]  [address]  [phone]      May 31, 2017      Betina Tinsley  [address] [address]              Dear MsJourdan Esquivelon,    Your mammogram is normal. Plan to repeat in 1 year.     Please call or email with any additional questions or concerns         Sincerely,    ROBBIN Ansari CNP/EC CMA

## 2017-05-31 NOTE — PATIENT INSTRUCTIONS
Proper skin care from Dr. Valera- [facility]     Eliminate harsh soaps, i.e. Dial, Zest, Julisa Spring;   Use mild soaps such as Cetaphil or Dove Sensitive Skin   Avoid hot or cold showers   After showering, lightly dry off.    Aggressive use of a moisturizer (including Vanicream, Cetaphil, Aquafor or Cerave)   We recommend using a tub that needs to be scooped out, not a pump. This has more of an oil base. It will hold moisture in your skin much better than a water base moisturizer. The ones recommended are non- pore clogging.    **Cream was sent your primary pharmacy along with a shampoo to the affected areas on your nose    **Follow up in 6 months with      If you have any questions call [phone] and follow the prompts to Dr. Valera's office.

## 2017-05-31 NOTE — PROGRESS NOTES
Betina Tinsley is a 52 year old year old female patient here today for rash on face and nose.   .  Patient states this has been present for years.  Patient reports the following symptoms:  itching.  Patient reports the following previous treatments none.  Patient reports the following modifying factors none.  Associated symptoms: none.  Patient has no other skin complaints today.  Remainder of the HPI, Meds, PMH, Allergies, FH, and SH was reviewed in chart.    History reviewed. No pertinent past medical history.    Past Surgical History:   Procedure Laterality Date     LAPAROSCOPIC APPENDECTOMY  10/18/2012    Procedure: LAPAROSCOPIC APPENDECTOMY;  Laparoscopic Appendectomy;  Surgeon: Gutierrez Gauthier MD;  Location: [facility] OR        Family History   Problem Relation Age of Onset     Dementia Mother      Myocardial Infarction Father      CANCER Father      skin       Social History     Social History     Marital status:      Spouse name: N/A     Number of children: N/A     Years of education: N/A     Occupational History     Not on file.     Social History Main Topics     Smoking status: Never Smoker     Smokeless tobacco: Not on file     Alcohol use No     Drug use: No     Sexual activity: No     Other Topics Concern     Not on file     Social History Narrative       Outpatient Encounter Prescriptions as of 5/31/2017   Medication Sig Dispense Refill     ciclopirox (LOPROX) 0.77 % cream Apply topically At Bedtime 90 g 3     desonide (DESOWEN) 0.05 % cream Apply sparingly to affected area three times daily as needed. 60 g 3     ketoconazole (NIZORAL) 2 % shampoo Apply to the affected area and wash off after 5 minutes. 240 mL 11     tolterodine (DETROL LA) 2 MG 24 hr capsule Take 1 capsule (2 mg) by mouth daily 90 capsule 3     atenolol (TENORMIN) 100 MG tablet Take 1 tablet (100 mg) by mouth daily 90 tablet 1     LamoTRIgine (LAMICTAL PO) Take 150 mg by mouth daily       LORazepam (ATIVAN PO) Take 0.5 mg by  mouth daily as needed for anxiety       BusPIRone HCl (BUSPAR PO) Take 30 mg by mouth 2 times daily       Escitalopram Oxalate (LEXAPRO PO) Take 20 mg by mouth daily       OMEPRAZOLE PO Take 20 mg by mouth daily       mometasone (NASONEX) 50 MCG/ACT nasal spray Spray 2 sprays into both nostrils daily       Acetaminophen-Caffeine (EXCEDRIN QUICKTABS PO) Take 1-2 tablets by mouth at onset of headache.         Nutritional Supplements (MELATONIN PO) Take 5 mg by mouth At Bedtime.         Zolpidem Tartrate (AMBIEN PO) Take 5-10 mg by mouth nightly as needed.         AMITRIPTYLINE HCL 50 MG PO TABS 1 TABLET AT BEDTIME       No facility-administered encounter medications on file as of 5/31/2017.              Review Of Systems  Skin: As above  Eyes: negative  Ears/Nose/Throat: negative  Respiratory: No shortness of breath, dyspnea on exertion, cough, or hemoptysis  Cardiovascular: negative  Gastrointestinal: negative  Genitourinary: negative  Musculoskeletal: negative  Neurologic: negative  Psychiatric: negative  Hematologic/Lymphatic/Immunologic: negative  Endocrine: negative      O:   NAD, WDWN, Alert & Oriented, Mood & Affect wnl, Vitals stable   Here today alone   /68  Pulse 67  SpO2 97%   General appearance normal   Vitals stable   Alert, oriented and in no acute distress      Following lymph nodes palpated: Occipital, Cervical, Supraclavicular no lad   Yellow papules on face   Stuck on papules and brown macules on trunk and ext    Greasy papules on nlf and brows         The remainder of the full exam was unremarkable; the following areas were examined:  conjunctiva/lids, oral mucosa, neck, peripheral vascular system, abdomen, lymph nodes, digits/nails, eccrine and apocrine glands, scalp/hair, face, neck, chest, abdomen, buttocks, back, RUE, LUE, RLE, LLE       Eyes: Conjunctivae/lids:Normal     ENT: Lips, buccal mucosa, tongue: normal    MSK:Normal    Cardiovascular: peripheral edema none    Pulm: Breathing  Normal    Lymph Nodes: No Head and Neck Lymphadenopathy     Neuro/Psych: Orientation:Normal; Mood/Affect:Normal      A/P:  1. Sebaceous hyperplasia, seborrheic keratosis, lentigo  2. Seb derm  Pathophysiology discussed with rené wall weekly  Loprox bedtime  Desonide prn  Return to clinic 6 months  BENIGN LESIONS DISCUSSED WITH PATIENT:  I discussed the specifics of tumor, prognosis, and genetics of benign lesions.  I explained that treatment of these lesions would be purely cosmetic and not medically neccessary.  I discussed with patient different removal options including excision, cautery and /or laser.      Nature and genetics of benign skin lesions dicussed with patient.  Signs and Symptoms of skin cancer discussed with patient.  ABCDEs of melanoma reviewed with patient.  Patient encouraged to perform monthly skin exams.  UV precautions reviewed with patient.  Skin care regimen reviewed with patient: Eliminate harsh soaps, i.e. Dial, zest, irsih spring; Mild soaps such as Cetaphil or Dove sensitive skin, avoid hot or cold showers, aggressive use of emollients including vanicream, cetaphil or cerave discussed with patient.    Risks of non-melanoma skin cancer discussed with patient   Return to clinic 6 months

## 2017-05-31 NOTE — MR AVS SNAPSHOT
After Visit Summary   5/31/2017    Betina Tinsley    MRN: [id number]           Patient Information     Date Of Birth          1965        Visit Information        Provider Department      5/31/2017 10:15 AM Vladimir Valera MD [facility]        Care Instructions    Proper skin care from Dr. Valera- [facility]     Eliminate harsh soaps, i.e. Dial, Zest, Julisa Spring;   Use mild soaps such as Cetaphil or Dove Sensitive Skin   Avoid hot or cold showers   After showering, lightly dry off.    Aggressive use of a moisturizer (including Vanicream, Cetaphil, Aquafor or Cerave)   We recommend using a tub that needs to be scooped out, not a pump. This has more of an oil base. It will hold moisture in your skin much better than a water base moisturizer. The ones recommended are non- pore clogging.    **Cream was sent your primary pharmacy along with a shampoo to the affected areas on your nose    **Follow up in 6 months with      If you have any questions call [phone] and follow the prompts to Dr. Valera's office.           Follow-ups after your visit        Future tests that were ordered for you today     Open Future Orders        Priority Expected Expires Ordered    MA Screen Bilateral w/Shadi Routine  3/13/2018 3/13/2017            Who to contact     If you have questions or need follow up information about today's clinic visit or your schedule please contact [facility] directly at [phone].  Normal or non-critical lab and imaging results will be communicated to you by MyChart, letter or phone within 4 business days after the clinic has received the results. If you do not hear from us within 7 days, please contact the clinic through MyChart or phone. If you have a critical or abnormal lab result, we will notify you by phone as soon as possible.  Submit refill requests through [employer] or call your pharmacy and they will forward the refill  "request to us. Please allow 3 business days for your refill to be completed.          Additional Information About Your Visit        MyChart Information     [employer] lets you send messages to your doctor, view your test results, renew your prescriptions, schedule appointments and more. To sign up, go to [email or web address] . Click on \"Log in\" on the left side of the screen, which will take you to the Welcome page. Then click on \"Sign up Now\" on the right side of the page.     You will be asked to enter the access code listed below, as well as some personal information. Please follow the directions to create your username and password.     Your access code is: [email or web address]  Expires: 2017 10:15 AM     Your access code will  in 90 days. If you need help or a new code, please call your [facility] clinic or [phone].        Care EveryWhere ID     This is your Care EveryWhere ID. This could be used by other organizations to access your [facility] medical records  [id number]        Your Vitals Were     Pulse Pulse Oximetry                67 97%           Blood Pressure from Last 3 Encounters:   17 118/68   17 96/66   17 114/67    Weight from Last 3 Encounters:   17 90.4 kg (199 lb 3.2 oz)   17 89.8 kg (198 lb)   16 93.9 kg (207 lb)              Today, you had the following     No orders found for display       Primary Care Provider Office Phone # Fax #    ROBBIN Ren [phone]       [facility] [address]   [facility] [address]        Thank you!     Thank you for choosing [facility]  for your care. Our goal is always to provide you with excellent care. Hearing back from our patients is one way we can continue to improve our services. Please take a few minutes to complete the written survey that you may receive in the mail after your visit with us. Thank you!             Your Updated Medication List - " Protect others around you: Learn how to safely use, store and throw away your medicines at www.disposemymeds.org.          This list is accurate as of: 5/31/17 10:15 AM.  Always use your most recent med list.                   Brand Name Dispense Instructions for use    AMBIEN PO      Take 5-10 mg by mouth nightly as needed.       amitriptyline 50 MG tablet    ELAVIL     1 TABLET AT BEDTIME       atenolol 100 MG tablet    TENORMIN    90 tablet    Take 1 tablet (100 mg) by mouth daily       ATIVAN PO      Take 0.5 mg by mouth daily as needed for anxiety       BUSPAR PO      Take 30 mg by mouth 2 times daily       EXCEDRIN QUICKTABS PO      Take 1-2 tablets by mouth at onset of headache.       LAMICTAL PO      Take 150 mg by mouth daily       LEXAPRO PO      Take 20 mg by mouth daily       MELATONIN PO      Take 5 mg by mouth At Bedtime.       mometasone 50 MCG/ACT spray    NASONEX     Spray 2 sprays into both nostrils daily       OMEPRAZOLE PO      Take 20 mg by mouth daily       tolterodine 2 MG 24 hr capsule    DETROL LA    90 capsule    Take 1 capsule (2 mg) by mouth daily

## 2017-06-01 NOTE — TELEPHONE ENCOUNTER
6/1/2017    Call Regarding Onboarding [employer] choices     Attempt 2    Message on voicemail     Comments:       Outreach   cnt

## 2017-08-09 DIAGNOSIS — R00.8 TRIGEMINY: ICD-10-CM

## 2017-08-09 DIAGNOSIS — I10 BENIGN ESSENTIAL HYPERTENSION: ICD-10-CM

## 2017-08-09 RX ORDER — METOPROLOL SUCCINATE 100 MG/1
100 TABLET, EXTENDED RELEASE ORAL DAILY
Qty: 30 TABLET | Refills: 1 | Status: SHIPPED | OUTPATIENT
Start: 2017-08-09 | End: 2017-10-19

## 2017-08-09 RX ORDER — ATENOLOL 100 MG/1
100 TABLET ORAL DAILY
Qty: 90 TABLET | Refills: 1 | Status: CANCELLED | OUTPATIENT
Start: 2017-08-09

## 2017-08-09 NOTE — TELEPHONE ENCOUNTER
atenolol (TENORMIN) 100 MG tablet #@ NEED ALT RX, ATENOLOL NOT AVAILABLE#@       Last Written Prescription Date: 3/13/17  Last Fill Quantity: 90, # refills: 1    Last Office Visit with [facility], [facility] or [facility] prescribing provider:  3/13/17   Future Office Visit:        BP Readings from Last 3 Encounters:   05/31/17 118/68   03/13/17 96/66   02/09/17 114/67

## 2017-08-16 ENCOUNTER — TELEPHONE (OUTPATIENT)
Dept: FAMILY MEDICINE | Facility: CLINIC | Age: 52
End: 2017-08-16

## 2017-08-16 DIAGNOSIS — Z53.9 ERRONEOUS ENCOUNTER--DISREGARD: Primary | ICD-10-CM

## 2017-08-16 NOTE — TELEPHONE ENCOUNTER
Pt called and states that she was put on metoprolol because there is a atenolol shortage, she states that she was on the metoprolol about 15 years ago and had a side effect of very heightened senses and it caused extreme sensitivities in her private parts causing her to have uncontrolled organism at work and wants to know if she has this side effect again is there a different med she can try?     [place]

## 2017-08-16 NOTE — TELEPHONE ENCOUNTER
I am not sure what dose she was taking 15 years ago or if she was taking the 12 hour metoprolol or the 24 hr metoprolol. Recommend trying this med but most definitely if has that side effect again will stop it and change to a different blood pressure med.    Please call or email with any additional questions or concerns  ROBBIN Ansari CNP

## 2017-08-27 DIAGNOSIS — E78.5 HYPERLIPIDEMIA LDL GOAL <100: ICD-10-CM

## 2017-08-28 NOTE — TELEPHONE ENCOUNTER
Simvastatin  10mg     Last Written Prescription Date: 05/31/2017 #90 x 0  Last filled 05/31/2017  Last Office Visit with [facility], [facility] or [facility] prescribing provider: 03/13/2017 EVETTE Wells       Lab Results   Component Value Date    CHOL 245 05/31/2017     Lab Results   Component Value Date    HDL 50 05/31/2017     Lab Results   Component Value Date     05/31/2017     Lab Results   Component Value Date    TRIG 147 05/31/2017     No results found for: CHOLVERONIQUE

## 2017-08-29 RX ORDER — SIMVASTATIN 10 MG
TABLET ORAL
Qty: 90 TABLET | Refills: 2 | Status: SHIPPED | OUTPATIENT
Start: 2017-08-29 | End: 2018-06-04

## 2017-08-29 NOTE — TELEPHONE ENCOUNTER
Prescription approved per [facility] [place] or patient Primary care provider (PCP)  ODMINIQUE Saini RN/Eliel Mcnulty

## 2017-09-08 ENCOUNTER — TELEPHONE (OUTPATIENT)
Dept: FAMILY MEDICINE | Facility: CLINIC | Age: 52
End: 2017-09-08

## 2017-09-08 NOTE — TELEPHONE ENCOUNTER
Pt left voicemail for pcp, wants to know if pcp will extend her simvastatin 10mg for another month. Pt unable to come in clinic and get labs done due to new job. Please advise.       Thank You,    Central Scheduler  Galina MYLES

## 2017-10-19 DIAGNOSIS — I10 BENIGN ESSENTIAL HYPERTENSION: ICD-10-CM

## 2017-10-19 DIAGNOSIS — R00.8 TRIGEMINY: ICD-10-CM

## 2017-10-20 RX ORDER — METOPROLOL SUCCINATE 100 MG/1
TABLET, EXTENDED RELEASE ORAL
Qty: 30 TABLET | Refills: 1 | Status: SHIPPED | OUTPATIENT
Start: 2017-10-20 | End: 2017-10-30

## 2017-10-20 NOTE — TELEPHONE ENCOUNTER
Prescription [place] per [facility] [place].  Nataliia Fair RN

## 2017-10-20 NOTE — TELEPHONE ENCOUNTER
metoprolol (TOPROL-XL) 100 MG 24 hr tablet      Last Written Prescription Date: 08/09/17  Last Fill Quantity: 30, # refills: 1  Last Office Visit with [facility], [facility] or [facility] prescribing provider: 03/13/17 Priscilla  Next 5 appointments (look out 90 days)     Nov 29, 2017  2:30 PM CST   Return Visit with Vladimir Valera MD   [facility]    [address] [address]   [phone]                   Potassium   Date Value Ref Range Status   03/16/2016 3.8 3.4 - 5.3 mmol/L Final     Creatinine   Date Value Ref Range Status   03/16/2016 0.67 0.52 - 1.04 mg/dL Final     BP Readings from Last 3 Encounters:   05/31/17 118/68   03/13/17 96/66   02/09/17 114/67

## 2017-10-27 ENCOUNTER — TELEPHONE (OUTPATIENT)
Dept: FAMILY MEDICINE | Facility: CLINIC | Age: 52
End: 2017-10-27

## 2017-10-27 NOTE — TELEPHONE ENCOUNTER
Needs appointment to see me as well. Please set up with office appointment  Time and will order labs at appointment . I have openings on 11/1      ROBBIN Ansari CNP

## 2017-10-27 NOTE — TELEPHONE ENCOUNTER
Patient has a lab appt on Wed. 11/1 and would like  compelete blood work done.  Cholesterol, liver, thyroid or whatever you would like to check.    [facility]

## 2017-10-30 ENCOUNTER — OFFICE VISIT (OUTPATIENT)
Dept: FAMILY MEDICINE | Facility: CLINIC | Age: 52
End: 2017-10-30
Payer: COMMERCIAL

## 2017-10-30 VITALS
TEMPERATURE: 97.2 F | WEIGHT: 202 LBS | BODY MASS INDEX: 39.45 KG/M2 | DIASTOLIC BLOOD PRESSURE: 62 MMHG | SYSTOLIC BLOOD PRESSURE: 98 MMHG | HEART RATE: 68 BPM

## 2017-10-30 DIAGNOSIS — I10 BENIGN ESSENTIAL HYPERTENSION: ICD-10-CM

## 2017-10-30 DIAGNOSIS — E78.2 MIXED HYPERLIPIDEMIA: Primary | ICD-10-CM

## 2017-10-30 DIAGNOSIS — R00.8 TRIGEMINY: ICD-10-CM

## 2017-10-30 PROBLEM — F34.1 DYSTHYMIA: Status: ACTIVE | Noted: 2017-10-30

## 2017-10-30 LAB
ALBUMIN SERPL-MCNC: 3.7 G/DL (ref 3.4–5)
ALP SERPL-CCNC: 63 U/L (ref 40–150)
ALT SERPL W P-5'-P-CCNC: 24 U/L (ref 0–50)
ANION GAP SERPL CALCULATED.3IONS-SCNC: 7 MMOL/L (ref 3–14)
AST SERPL W P-5'-P-CCNC: 20 U/L (ref 0–45)
BASOPHILS # BLD AUTO: 0 10E9/L (ref 0–0.2)
BASOPHILS NFR BLD AUTO: 0.5 %
BILIRUB SERPL-MCNC: 0.3 MG/DL (ref 0.2–1.3)
BUN SERPL-MCNC: 16 MG/DL (ref 7–30)
CALCIUM SERPL-MCNC: 8.6 MG/DL (ref 8.5–10.1)
CHLORIDE SERPL-SCNC: 102 MMOL/L (ref 94–109)
CHOLEST SERPL-MCNC: 170 MG/DL
CO2 SERPL-SCNC: 27 MMOL/L (ref 20–32)
CREAT SERPL-MCNC: 0.82 MG/DL (ref 0.52–1.04)
CREAT UR-MCNC: 138 MG/DL
DIFFERENTIAL METHOD BLD: NORMAL
EOSINOPHIL # BLD AUTO: 0.2 10E9/L (ref 0–0.7)
EOSINOPHIL NFR BLD AUTO: 2.8 %
ERYTHROCYTE [DISTWIDTH] IN BLOOD BY AUTOMATED COUNT: 13 % (ref 10–15)
GFR SERPL CREATININE-BSD FRML MDRD: 72 ML/MIN/1.7M2
GLUCOSE SERPL-MCNC: 100 MG/DL (ref 70–99)
HCT VFR BLD AUTO: 38.2 % (ref 35–47)
HDLC SERPL-MCNC: 50 MG/DL
HGB BLD-MCNC: 12.8 G/DL (ref 11.7–15.7)
LDLC SERPL CALC-MCNC: 96 MG/DL
LYMPHOCYTES # BLD AUTO: 2.2 10E9/L (ref 0.8–5.3)
LYMPHOCYTES NFR BLD AUTO: 35.3 %
MCH RBC QN AUTO: 29.5 PG (ref 26.5–33)
MCHC RBC AUTO-ENTMCNC: 33.5 G/DL (ref 31.5–36.5)
MCV RBC AUTO: 88 FL (ref 78–100)
MICROALBUMIN UR-MCNC: 60 MG/L
MICROALBUMIN/CREAT UR: 43.48 MG/G CR (ref 0–25)
MONOCYTES # BLD AUTO: 0.4 10E9/L (ref 0–1.3)
MONOCYTES NFR BLD AUTO: 6 %
NEUTROPHILS # BLD AUTO: 3.4 10E9/L (ref 1.6–8.3)
NEUTROPHILS NFR BLD AUTO: 55.4 %
NONHDLC SERPL-MCNC: 120 MG/DL
PLATELET # BLD AUTO: 249 10E9/L (ref 150–450)
POTASSIUM SERPL-SCNC: 3.8 MMOL/L (ref 3.4–5.3)
PROT SERPL-MCNC: 7.5 G/DL (ref 6.8–8.8)
RBC # BLD AUTO: 4.34 10E12/L (ref 3.8–5.2)
SODIUM SERPL-SCNC: 136 MMOL/L (ref 133–144)
TRIGL SERPL-MCNC: 118 MG/DL
TSH SERPL DL<=0.005 MIU/L-ACNC: 3.03 MU/L (ref 0.4–4)
WBC # BLD AUTO: 6.2 10E9/L (ref 4–11)

## 2017-10-30 PROCEDURE — 36415 COLL VENOUS BLD VENIPUNCTURE: CPT | Performed by: NURSE PRACTITIONER

## 2017-10-30 PROCEDURE — 99213 OFFICE O/P EST LOW 20 MIN: CPT | Performed by: NURSE PRACTITIONER

## 2017-10-30 PROCEDURE — 82043 UR ALBUMIN QUANTITATIVE: CPT | Performed by: NURSE PRACTITIONER

## 2017-10-30 PROCEDURE — 80050 GENERAL HEALTH PANEL: CPT | Performed by: NURSE PRACTITIONER

## 2017-10-30 PROCEDURE — 80061 LIPID PANEL: CPT | Performed by: NURSE PRACTITIONER

## 2017-10-30 RX ORDER — METOPROLOL SUCCINATE 100 MG/1
100 TABLET, EXTENDED RELEASE ORAL DAILY
Qty: 90 TABLET | Refills: 1 | Status: SHIPPED | OUTPATIENT
Start: 2017-10-30 | End: 2018-03-29

## 2017-10-30 ASSESSMENT — ENCOUNTER SYMPTOMS
VOMITING: 0
ABDOMINAL PAIN: 0
NUMBNESS: 0
SHORTNESS OF BREATH: 0
ABDOMINAL DISTENTION: 0
CONSTIPATION: 0
MYALGIAS: 0
RHINORRHEA: 0
DIARRHEA: 0
ARTHRALGIAS: 1
COUGH: 0
DIZZINESS: 0
FATIGUE: 0
NAUSEA: 0
CHEST TIGHTNESS: 0
HEADACHES: 0
SORE THROAT: 0
PALPITATIONS: 0
LIGHT-HEADEDNESS: 0
WHEEZING: 0

## 2017-10-30 NOTE — NURSING NOTE
Chief Complaint   Patient presents with     Recheck Medication       Initial BP 98/62 (BP Location: Left arm, Patient Position: Sitting, Cuff Size: Adult Large)  Pulse 68  Temp 97.2  F (36.2  C) (Tympanic)  Wt 202 lb (91.6 kg)  BMI 39.45 kg/m2 Estimated body mass index is 39.45 kg/(m^2) as calculated from the following:    Height as of 2/9/17: 5' (1.524 m).    Weight as of this encounter: 202 lb (91.6 kg).  Medication Reconciliation: complete     Pamela Amador CMA

## 2017-10-30 NOTE — MR AVS SNAPSHOT
"              After Visit Summary   10/30/2017    Betina Tinsley    MRN: [id number]           Patient Information     Date Of Birth          1965        Visit Information        Provider Department      10/30/2017 8:00 AM Madeleine Wells APRN CNP [facility]        Today's Diagnoses     Mixed hyperlipidemia    -  1    Benign essential hypertension        Trigeminy           Follow-ups after your visit        Your next 10 appointments already scheduled     Nov 29, 2017  2:30 PM CST   [facility] Visit with Vladimir [facility]era MD   [facility]    [address] [address]   [phone]              Who to contact     Normal or non-critical lab and imaging results will be communicated to you by [employer], letter or phone within 4 business days after the clinic has received the results. If you do not hear from us within 7 days, please contact the clinic through MyChart or phone. If you have a critical or abnormal lab result, we will notify you by phone as soon as possible.  Submit refill requests through [employer] or call your pharmacy and they will forward the refill request to us. Please allow 3 business days for your refill to be completed.          If you need to speak with a  for additional information , please call: [phone]           Additional Information About Your Visit        [employer] Information     [employer] lets you send messages to your doctor, view your test results, renew your prescriptions, schedule appointments and more. To sign up, go to [email or web address] . Click on \"Log in\" on the left side of the screen, which will take you to the Welcome page. Then click on \"Sign up Now\" on the right side of the page.     You will be asked to enter the access code listed below, as well as some personal information. Please follow the directions to create your username and password.     Your access code is: " [email or web address]  Expires: 2018  8:32 AM     Your access code will  in 90 days. If you need help or a new code, please call your [facility] clinic or [phone].        Care EveryWhere ID     This is your Care EveryWhere ID. This could be used by other organizations to access your [facility] medical records  [id number]        Your Vitals Were     Pulse Temperature BMI (Body Mass Index)             68 97.2  F (36.2  C) (Tympanic) 39.45 kg/m2          Blood Pressure from Last 3 Encounters:   10/30/17 98/62   17 118/68   17 96/66    Weight from Last 3 Encounters:   10/30/17 202 lb (91.6 kg)   17 199 lb 3.2 oz (90.4 kg)   17 198 lb (89.8 kg)              We Performed the Following     Albumin Random Urine Quantitative with Creat Ratio     CBC with platelets differential     Comprehensive metabolic panel     Lipid panel reflex to direct LDL Fasting     TSH with free T4 reflex          Today's Medication Changes          These changes are accurate as of: 10/30/17  8:32 AM.  If you have any questions, ask your nurse or doctor.               These medicines have changed or have updated prescriptions.        Dose/Directions    metoprolol 100 MG 24 hr tablet   Commonly known as:  TOPROL-XL   This may have changed:  See the new instructions.   Used for:  Benign essential hypertension, Trigeminy   Changed by:  Madeleine Wells APRN CNP        Dose:  100 mg   Take 1 tablet (100 mg) by mouth daily   Quantity:  90 tablet   Refills:  1            Where to get your medicines      These medications were sent to [facility] [id number] [facility] [employer] [facility] [address] [facility]  [address] [address]     Phone:  [phone]     metoprolol 100 MG 24 hr tablet                Primary Care Provider Office Phone # Fax #    ROBBIN Ren [phone]       [facility] [address] [place] [address] [address] [place] [address]        Equal Access to Services     JULIO TELLO  AH: Hadii oseil ku hadrikio Sohomeroali, waaxda luqadaha, qaybta kaalmada adereji, siddhartha de la cruz laCarolyndarion baires. So [facility] [phone].    ATENCIÓN: Si genesis stratton, tiene a tsang disposición servicios gratuitos de asistencia lingüística. Llame al [phone].    We comply with applicable federal civil rights laws and [place] laws. We do not discriminate on the basis of race, color, national origin, age, disability, sex, sexual orientation, or gender identity.            Thank you!     Thank you for choosing [facility]  for your care. Our goal is always to provide you with excellent care. Hearing back from our patients is one way we can continue to improve our services. Please take a few minutes to complete the written survey that you may receive in the mail after your visit with us. Thank you!             Your Updated Medication List - Protect others around you: Learn how to safely use, store and throw away your medicines at www.disposemymeds.org.          This list is accurate as of: 10/30/17  8:32 AM.  Always use your most recent med list.                   Brand Name Dispense Instructions for use Diagnosis    AMBIEN PO      Take 5-10 mg by mouth nightly as needed.        amitriptyline 50 MG tablet    ELAVIL     1 TABLET AT BEDTIME        ATIVAN PO      Take 0.5 mg by mouth daily as needed for anxiety        BUSPAR PO      Take 30 mg by mouth 2 times daily        ciclopirox 0.77 % cream    LOPROX    90 g    Apply topically At Bedtime    Senile sebaceous gland hyperplasia, Dermatitis, seborrheic, Lentigo, SK (seborrheic keratosis)       desonide 0.05 % cream    DESOWEN    60 g    Apply sparingly to affected area three times daily as needed.    Senile sebaceous gland hyperplasia, Dermatitis, seborrheic, Lentigo, SK (seborrheic keratosis)       EXCEDRIN QUICKTABS PO      Take 1-2 tablets by mouth at onset of headache.        ketoconazole 2 % shampoo    NIZORAL    240 mL    Apply to the  affected area and wash off after 5 minutes.    Senile sebaceous gland hyperplasia, Dermatitis, seborrheic, Lentigo, SK (seborrheic keratosis)       LAMICTAL PO      Take 150 mg by mouth daily        LEXAPRO PO      Take 20 mg by mouth daily        MELATONIN PO      Take 5 mg by mouth At Bedtime.        metoprolol 100 MG 24 hr tablet    TOPROL-XL    90 tablet    Take 1 tablet (100 mg) by mouth daily    Benign essential hypertension, Trigeminy       mometasone 50 MCG/ACT spray    NASONEX     Spray 2 sprays into both nostrils daily        OMEPRAZOLE PO      Take 20 mg by mouth daily        simvastatin 10 MG tablet    ZOCOR    90 tablet    TAKE 1 TABLET (10 MG) BY MOUTH AT BEDTIME    Hyperlipidemia LDL goal <100       tolterodine 2 MG 24 hr capsule    DETROL LA    90 capsule    Take 1 capsule (2 mg) by mouth daily    Urinary frequency

## 2017-10-30 NOTE — LETTER
November 1, 2017      Betina Tinsley  [address] [address]        Dear ,    We are writing to inform you of your test results.    WOW!! Your cholesterol is much improved. I would recommend continueing on same dose of medication for now and plan to recheck again in 6-12 months.     The rest of your blood tests are stable.     If you have any questions or concerns, please call the clinic at the number listed above.       Sincerely,        ROBBIN Delcid CNP/EC CMA

## 2017-10-30 NOTE — PROGRESS NOTES
SUBJECTIVE:   Betina Tnisley is a 52 year old female who presents to clinic today for the following health issues:      Hyperlipidemia Follow-Up      Rate your low fat/cholesterol diet?: good    Taking statin?  Yes, no muscle aches from statin    Other lipid medications/supplements?:  none    Hypertension Follow-up      Outpatient blood pressures are not being checked.    Low Salt Diet: low salt          Amount of exercise or physical activity: 4-5 days/week for an average of greater than 60 minutes    Problems taking medications regularly: No    Medication side effects: none  Diet: regular (no restrictions)      Problem list and histories reviewed & adjusted, as indicated.  Additional history: as documented    Current Outpatient Prescriptions   Medication Sig Dispense Refill     metoprolol (TOPROL-XL) 100 MG 24 hr tablet Take 1 tablet (100 mg) by mouth daily 90 tablet 1     simvastatin (ZOCOR) 10 MG tablet TAKE 1 TABLET (10 MG) BY MOUTH AT BEDTIME 90 tablet 2     ciclopirox (LOPROX) 0.77 % cream Apply topically At Bedtime 90 g 3     tolterodine (DETROL LA) 2 MG 24 hr capsule Take 1 capsule (2 mg) by mouth daily 90 capsule 3     LamoTRIgine (LAMICTAL PO) Take 150 mg by mouth daily       LORazepam (ATIVAN PO) Take 0.5 mg by mouth daily as needed for anxiety       BusPIRone HCl (BUSPAR PO) Take 30 mg by mouth 2 times daily       Escitalopram Oxalate (LEXAPRO PO) Take 20 mg by mouth daily       OMEPRAZOLE PO Take 20 mg by mouth daily       mometasone (NASONEX) 50 MCG/ACT nasal spray Spray 2 sprays into both nostrils daily       Nutritional Supplements (MELATONIN PO) Take 5 mg by mouth At Bedtime.         Zolpidem Tartrate (AMBIEN PO) Take 5-10 mg by mouth nightly as needed.         AMITRIPTYLINE HCL 50 MG PO TABS 1 TABLET AT BEDTIME       [DISCONTINUED] metoprolol (TOPROL-XL) 100 MG 24 hr tablet TAKE 1 TABLET (100 MG) BY MOUTH DAILY. FOLLOW UP IN OFFICE IN 1 MONTH TO CHECK FOR B/P CONTROL. 30 tablet 1     desonide  (DESOWEN) 0.05 % cream Apply sparingly to affected area three times daily as needed. (Patient not taking: Reported on 10/30/2017) 60 g 3     ketoconazole (NIZORAL) 2 % shampoo Apply to the affected area and wash off after 5 minutes. (Patient not taking: Reported on 10/30/2017) 240 mL 11     Acetaminophen-Caffeine (EXCEDRIN QUICKTABS PO) Take 1-2 tablets by mouth at onset of headache.         Allergies   Allergen Reactions     Hydromorphone Rash       Reviewed and updated as needed this visit by clinical staff  Tobacco  Allergies  Meds  Problems  Med Hx  Surg Hx  Fam Hx  Soc Hx        Reviewed and updated as needed this visit by Provider  Problems          Here today for med recheck. Does have some aching but started a different job. Right foot, ankle hurt a lot. Continues to go chiro and for this and continues to re-injure it. Shoulders ache all the time but does have to do a lot of reaching over head with job. Is on feet and walks a lot at work. Also, works at [employer] Sat evenings. No home over the counter meds for this.     Has been seeing mental health for meds. Wondering if can get them through here.     Had influenza vaccine already in Sept    ROS:  Review of Systems   Constitutional: Negative for fatigue.   HENT: Negative for ear pain, rhinorrhea and sore throat.    Eyes: Negative for visual disturbance.   Respiratory: Negative for cough, chest tightness, shortness of breath and wheezing.    Cardiovascular: Negative for chest pain, palpitations and leg swelling.   Gastrointestinal: Negative for abdominal distention, abdominal pain, constipation, diarrhea, nausea and vomiting.   Endocrine: Negative for cold intolerance and heat intolerance.   Musculoskeletal: Positive for arthralgias (right ankle, bilat shoulders). Negative for myalgias.   Skin: Negative for rash.   Neurological: Negative for dizziness, light-headedness, numbness and headaches.         OBJECTIVE:     BP 98/62 (BP Location: Left arm,  Patient Position: Sitting, Cuff Size: Adult Large)  Pulse 68  Temp 97.2  F (36.2  C) (Tympanic)  Wt 202 lb (91.6 kg)  BMI 39.45 kg/m2  Body mass index is 39.45 kg/(m^2).  Physical Exam   Constitutional: She appears well-developed and well-nourished.   HENT:   Head: Normocephalic and atraumatic.   Right Ear: Tympanic membrane and external ear normal. No middle ear effusion.   Left Ear: Tympanic membrane and external ear normal.  No middle ear effusion.   Nose: No mucosal edema.   Mouth/Throat: Oropharynx is clear and moist and mucous membranes are normal.   Neck: Carotid bruit is not present. No thyromegaly present.   Cardiovascular: Normal rate, regular rhythm and normal heart sounds.    Pulmonary/Chest: Effort normal and breath sounds normal.   Abdominal: Soft. Normal appearance and bowel sounds are normal.   Neurological: She is alert.   Skin: Skin is warm and dry.   Psychiatric: She has a normal mood and affect. Her behavior is normal.       ASSESSMENT/PLAN:   1. Mixed hyperlipidemia  Recheck fasting labs today  Tolerating medication well  No side effects   - Comprehensive metabolic panel  - Lipid panel reflex to direct LDL Fasting    2. Benign essential hypertension  Risk and potential complications of hypertension were reviewed with the patient  The patient understands that their hypertension in under good control currently  We reviewed the importance of medication compliance and regular follow-up  Lifestyle modification was encouraged  Encouraged to call or return:  With any chest pain or discomfort  Any shortness of breath or swelling of ankles   Headaches not relieved with over the counter meds  Any new or unexplained symptoms   Plan to follow up in 6 months  - Albumin Random Urine Quantitative with Creat Ratio  - CBC with platelets differential  - Comprehensive metabolic panel  - Lipid panel reflex to direct LDL Fasting  - TSH with free T4 reflex  - metoprolol (TOPROL-XL) 100 MG 24 hr tablet; Take 1  tablet (100 mg) by mouth daily  Dispense: 90 tablet; Refill: 1    3. Trigeminy  Doing well with metoprolol   - metoprolol (TOPROL-XL) 100 MG 24 hr tablet; Take 1 tablet (100 mg) by mouth daily  Dispense: 90 tablet; Refill: 1    Will need to continue to follow with mental health     ROBBIN Delcid [facility]

## 2018-01-19 ENCOUNTER — OFFICE VISIT (OUTPATIENT)
Dept: PODIATRY | Facility: CLINIC | Age: 53
End: 2018-01-19
Payer: COMMERCIAL

## 2018-01-19 DIAGNOSIS — M76.821 POSTERIOR TIBIAL TENDONITIS, RIGHT: Primary | ICD-10-CM

## 2018-01-19 PROCEDURE — 99212 OFFICE O/P EST SF 10 MIN: CPT | Performed by: PODIATRIST

## 2018-01-19 NOTE — PROGRESS NOTES
Betina returns to the office for reevaluation of the right foot.  The patient relates following the instructions given at the last visit with noted more pain.  The patient relates overall less  improvement in pain and function of the right foot.  The patient relates no other problems.    PAST MEDICAL HISTORY: No past medical history on file.    BMI= There is no height or weight on file to calculate BMI.        Physical Exam:    General: The patient appears to have a pleasant mental affect.    Lower extremity physical exam:  Neurovascular status is intact with palpable pedal pulses and intact epicritic sensations.  Muscular exam is within normal limits to major muscle groups.  Integument is intact.      One notes decreased edema.  One notes pain on palpation of the posterior tibial tendon on the right lower extremity. Noted pain with inversion against resistance on the right foot.     Assessment:      ICD-10-CM    1. Posterior tibial tendonitis, right M76.821        Plan:  I have explained to Betina about the conditions.  At this time, the patient was instructed on icing, stretching, tissue massage and support.  The patient was fitted with a trilok ankle brace that will aid in offloading the tension forces to the soft tissues and prevent further inflammation.   The patient will return in four weeks for reevaluation if the symptoms do not resolve.          Disclaimer: This note consists of symbols derived from keyboarding, dictation and/or voice recognition software. As a result, there may be errors in the script that have gone undetected. Please consider this when interpreting information found in this chart.       RACHAEL Tobin D.P.M., HAMMAD.F.A.S.

## 2018-01-19 NOTE — MR AVS SNAPSHOT
After Visit Summary   1/19/2018    Betina Tinsley    MRN: [id number]           Patient Information     Date Of Birth          1965        Visit Information        Provider Department      1/19/2018 12:00 PM Costa Tobin DPM [facility]        Today's Diagnoses     Posterior tibial tendonitis, right    -  1      Care Instructions    Initial musculoskeletal treatment recommendation:    1.  Stretch the calf muscles as instructed once an hour.  2.  Ice the injured area in the evening; 20 min on/off.  3.  Take antiinflammatory medication as directed.  4.  Massage the soft tissues around the injured area in the morning to loosen the tissue  5.  Wear supportive foot wear and/or arch supports (rigid not cushion).      If no improvement in symptoms within four to six weeks, return to clinic for reevaluation.            Follow-ups after your visit        Follow-up notes from your care team     Return in about 4 weeks (around 2/16/2018), or if symptoms worsen or fail to improve.      Your next 10 appointments already scheduled     Jan 29, 2018 11:20 AM CST   SHORT with ROBBIN Ren CNP   [facility]    [address] [address] [phone]              Who to contact     If you have questions or need follow up information about today's clinic visit or your schedule please contact [facility] directly at [phone].  Normal or non-critical lab and imaging results will be communicated to you by MyChart, letter or phone within 4 business days after the clinic has received the results. If you do not hear from us within 7 days, please contact the clinic through MyChart or phone. If you have a critical or abnormal lab result, we will notify you by phone as soon as possible.  Submit refill requests through "[employer]" or call your pharmacy and they will forward the refill request to us. Please  "allow 3 business days for your refill to be completed.          Additional Information About Your Visit        MyChart Information     [employer] lets you send messages to your doctor, view your test results, renew your prescriptions, schedule appointments and more. To sign up, go to [email or web address] . Click on \"Log in\" on the left side of the screen, which will take you to the Welcome page. Then click on \"Sign up Now\" on the right side of the page.     You will be asked to enter the access code listed below, as well as some personal information. Please follow the directions to create your username and password.     Your access code is: [email or web address]  Expires: 2018  7:32 AM     Your access code will  in 90 days. If you need help or a new code, please call your [place] clinic or [phone].        Care EveryWhere ID     This is your Care EveryWhere ID. This could be used by other organizations to access your [place] medical records  [id number]         Blood Pressure from Last 3 Encounters:   10/30/17 98/62   17 118/68   17 96/66    Weight from Last 3 Encounters:   10/30/17 202 lb (91.6 kg)   17 199 lb 3.2 oz (90.4 kg)   17 198 lb (89.8 kg)              Today, you had the following     No orders found for display         Today's Medication Changes          These changes are accurate as of 18 11:59 PM.  If you have any questions, ask your nurse or doctor.               Start taking these medicines.        Dose/Directions    order for DME   Used for:  Posterior tibial tendonitis, right   Started by:  Costa Tobin DPM        Trilok Ankle Brace   Quantity:  1 Device   Refills:  0            Where to get your medicines      Some of these will need a paper prescription and others can be bought over the counter.  Ask your nurse if you have questions.     Bring a paper prescription for each of these medications     order for DME                Primary Care Provider " Office Phone # Fax #    ROBBIN Ren [phone]       [facility] [address]   [place] [address]        Equal Access to Services     JULIO TELLO : Hadii aad ku hadrikio Sohomeroali, waaxda luqadaha, qaybta kaalmada adeegyada, waxay idiin haydanten bri de la cruz lamariaa baires. So [facility] [phone].    ATENCIÓN: Si habla español, tiene a tsang disposición servicios gratuitos de asistencia lingüística. Llame al [phone].    We comply with applicable federal civil rights laws and [place] laws. We do not discriminate on the basis of race, color, national origin, age, disability, sex, sexual orientation, or gender identity.            Thank you!     Thank you for choosing [facility]  for your care. Our goal is always to provide you with excellent care. Hearing back from our patients is one way we can continue to improve our services. Please take a few minutes to complete the written survey that you may receive in the mail after your visit with us. Thank you!             Your Updated Medication List - Protect others around you: Learn how to safely use, store and throw away your medicines at www.disposemymeds.org.          This list is accurate as of 1/19/18 11:59 PM.  Always use your most recent med list.                   Brand Name Dispense Instructions for use Diagnosis    AMBIEN PO      Take 5-10 mg by mouth nightly as needed.        amitriptyline 50 MG tablet    ELAVIL     1 TABLET AT BEDTIME        ATIVAN PO      Take 0.5 mg by mouth daily as needed for anxiety        BUSPAR PO      Take 30 mg by mouth 2 times daily        ciclopirox 0.77 % cream    LOPROX    90 g    Apply topically At Bedtime    Senile sebaceous gland hyperplasia, Dermatitis, seborrheic, Lentigo, SK (seborrheic keratosis)       desonide 0.05 % cream    DESOWEN    60 g    Apply sparingly to affected area three times daily as needed.    Senile sebaceous gland hyperplasia, Dermatitis,  seborrheic, Lentigo, SK (seborrheic keratosis)       EXCEDRIN QUICKTABS PO      Take 1-2 tablets by mouth at onset of headache.        ketoconazole 2 % shampoo    NIZORAL    240 mL    Apply to the affected area and wash off after 5 minutes.    Senile sebaceous gland hyperplasia, Dermatitis, seborrheic, Lentigo, SK (seborrheic keratosis)       LAMICTAL PO      Take 150 mg by mouth daily        LEXAPRO PO      Take 20 mg by mouth daily        MELATONIN PO      Take 5 mg by mouth At Bedtime.        metoprolol succinate 100 MG 24 hr tablet    TOPROL-XL    90 tablet    Take 1 tablet (100 mg) by mouth daily    Benign essential hypertension, Trigeminy       mometasone 50 MCG/ACT spray    NASONEX     Spray 2 sprays into both nostrils daily        OMEPRAZOLE PO      Take 20 mg by mouth daily        order for DME     1 Device    Trilok Ankle Brace    Posterior tibial tendonitis, right       simvastatin 10 MG tablet    ZOCOR    90 tablet    TAKE 1 TABLET (10 MG) BY MOUTH AT BEDTIME    Hyperlipidemia LDL goal <100       tolterodine 2 MG 24 hr capsule    DETROL LA    90 capsule    Take 1 capsule (2 mg) by mouth daily    Urinary frequency

## 2018-01-19 NOTE — LETTER
1/19/2018         RE: Betina Tinsley  [address] [address]        Dear Colleague,    Thank you for referring your patient, Betina Tinsley, to the [facility]. Please see a copy of my visit note below.    Betina returns to the office for reevaluation of the right foot.  The patient relates following the instructions given at the last visit with noted more pain.  The patient relates overall less  improvement in pain and function of the right foot.  The patient relates no other problems.    PAST MEDICAL HISTORY: No past medical history on file.    BMI= There is no height or weight on file to calculate BMI.        Physical Exam:    General: The patient appears to have a pleasant mental affect.    Lower extremity physical exam:  Neurovascular status is intact with palpable pedal pulses and intact epicritic sensations.  Muscular exam is within normal limits to major muscle groups.  Integument is intact.      One notes decreased edema.  One notes pain on palpation of the posterior tibial tendon on the right lower extremity. Noted pain with inversion against resistance on the right foot.     Assessment:      ICD-10-CM    1. Posterior tibial tendonitis, right M76.821        Plan:  I have explained to Betina about the conditions.  At this time, the patient was instructed on icing, stretching, tissue massage and support.  The patient was fitted with a trilok ankle brace that will aid in offloading the tension forces to the soft tissues and prevent further inflammation.   The patient will return in four weeks for reevaluation if the symptoms do not resolve.          Disclaimer: This note consists of symbols derived from keyboarding, dictation and/or voice recognition software. As a result, there may be errors in the script that have gone undetected. Please consider this when interpreting information found in this chart.       RACHAEL Tobin D.P.M., FMORE.C.F.A.S.      Again, thank you  for allowing me to participate in the care of your patient.        Sincerely,        Costa Tobin DPM

## 2018-01-29 ENCOUNTER — OFFICE VISIT (OUTPATIENT)
Dept: FAMILY MEDICINE | Facility: CLINIC | Age: 53
End: 2018-01-29
Payer: COMMERCIAL

## 2018-01-29 VITALS
TEMPERATURE: 97.4 F | DIASTOLIC BLOOD PRESSURE: 72 MMHG | HEART RATE: 78 BPM | BODY MASS INDEX: 39.12 KG/M2 | WEIGHT: 207.2 LBS | HEIGHT: 61 IN | OXYGEN SATURATION: 97 % | SYSTOLIC BLOOD PRESSURE: 120 MMHG

## 2018-01-29 DIAGNOSIS — R07.0 THROAT PAIN: ICD-10-CM

## 2018-01-29 DIAGNOSIS — R51.9 NONINTRACTABLE EPISODIC HEADACHE, UNSPECIFIED HEADACHE TYPE: ICD-10-CM

## 2018-01-29 DIAGNOSIS — R00.8 TRIGEMINY: ICD-10-CM

## 2018-01-29 DIAGNOSIS — R42 DIZZINESS: ICD-10-CM

## 2018-01-29 DIAGNOSIS — F34.1 DYSTHYMIA: Primary | ICD-10-CM

## 2018-01-29 DIAGNOSIS — J06.9 UPPER RESPIRATORY TRACT INFECTION, UNSPECIFIED TYPE: ICD-10-CM

## 2018-01-29 LAB
DEPRECATED S PYO AG THROAT QL EIA: NORMAL
SPECIMEN SOURCE: NORMAL

## 2018-01-29 PROCEDURE — 99214 OFFICE O/P EST MOD 30 MIN: CPT | Performed by: NURSE PRACTITIONER

## 2018-01-29 PROCEDURE — 93000 ELECTROCARDIOGRAM COMPLETE: CPT | Performed by: NURSE PRACTITIONER

## 2018-01-29 PROCEDURE — 87081 CULTURE SCREEN ONLY: CPT | Performed by: NURSE PRACTITIONER

## 2018-01-29 PROCEDURE — 87880 STREP A ASSAY W/OPTIC: CPT | Performed by: NURSE PRACTITIONER

## 2018-01-29 RX ORDER — AMOXICILLIN 500 MG/1
1000 CAPSULE ORAL 2 TIMES DAILY
Qty: 40 CAPSULE | Refills: 0 | Status: SHIPPED | OUTPATIENT
Start: 2018-01-29 | End: 2018-02-08

## 2018-01-29 RX ORDER — METOPROLOL SUCCINATE 50 MG/1
50 TABLET, EXTENDED RELEASE ORAL DAILY
Qty: 90 TABLET | Refills: 3 | Status: SHIPPED | OUTPATIENT
Start: 2018-01-29 | End: 2018-04-25 | Stop reason: DRUGHIGH

## 2018-01-29 ASSESSMENT — ENCOUNTER SYMPTOMS
COUGH: 1
RHINORRHEA: 0
WHEEZING: 1
ABDOMINAL DISTENTION: 0
VOMITING: 0
CHILLS: 0
SINUS PAIN: 1
ABDOMINAL PAIN: 0
PALPITATIONS: 0
EYE ITCHING: 0
FEVER: 0
SHORTNESS OF BREATH: 1
DIAPHORESIS: 0
ARTHRALGIAS: 0
SINUS PRESSURE: 0
MYALGIAS: 1
LIGHT-HEADEDNESS: 0
DIARRHEA: 0
CONSTIPATION: 0
HEADACHES: 1
NAUSEA: 0
FATIGUE: 1
SORE THROAT: 1
EYE DISCHARGE: 0
NUMBNESS: 0
DIZZINESS: 1
CHEST TIGHTNESS: 1

## 2018-01-29 ASSESSMENT — ANXIETY QUESTIONNAIRES
7. FEELING AFRAID AS IF SOMETHING AWFUL MIGHT HAPPEN: NOT AT ALL
3. WORRYING TOO MUCH ABOUT DIFFERENT THINGS: MORE THAN HALF THE DAYS
6. BECOMING EASILY ANNOYED OR IRRITABLE: MORE THAN HALF THE DAYS
GAD7 TOTAL SCORE: 8
5. BEING SO RESTLESS THAT IT IS HARD TO SIT STILL: NOT AT ALL
2. NOT BEING ABLE TO STOP OR CONTROL WORRYING: SEVERAL DAYS
1. FEELING NERVOUS, ANXIOUS, OR ON EDGE: SEVERAL DAYS

## 2018-01-29 ASSESSMENT — PATIENT HEALTH QUESTIONNAIRE - PHQ9: 5. POOR APPETITE OR OVEREATING: MORE THAN HALF THE DAYS

## 2018-01-29 NOTE — NURSING NOTE
"Chief Complaint   Patient presents with     URI       Initial /72 (BP Location: Right arm, Patient Position: Right side, Cuff Size: Adult Large)  Pulse 78  Temp 97.4  F (36.3  C) (Tympanic)  Ht 5' 0.5\" (1.537 m)  Wt 207 lb 3.2 oz (94 kg)  LMP 12/01/2017  SpO2 97%  BMI 39.8 kg/m2 Estimated body mass index is 39.8 kg/(m^2) as calculated from the following:    Height as of this encounter: 5' 0.5\" (1.537 m).    Weight as of this encounter: 207 lb 3.2 oz (94 kg).  Medication Reconciliation: complete     April CORAZON Amador      "

## 2018-01-29 NOTE — LETTER
My Depression Action Plan  Name: Betina Tinsley   Date of Birth 1965  Date: 1/29/2018    My doctor: Madeleine Wells   My clinic: [address] [address] [phone]          GREEN    ZONE   Good Control    What it looks like:     Things are going generally well. You have normal up s and down s. You may even feel depressed from time to time, but bad moods usually last less than a day.   What you need to do:  1. Continue to care for yourself (see self care plan)  2. Check your depression survival kit and update it as needed  3. Follow your physician s recommendations including any medication.  4. Do not stop taking medication unless you consult with your physician first.           YELLOW         ZONE Getting Worse    What it looks like:     Depression is starting to interfere with your life.     It may be hard to get out of bed; you may be starting to isolate yourself from others.    Symptoms of depression are starting to last most all day and this has happened for several days.     You may have suicidal thoughts but they are not constant.   What you need to do:     1. Call your care team, your response to treatment will improve if you keep your care team informed of your progress. Yellow periods are signs an adjustment may need to be made.     2. Continue your self-care, even if you have to fake it!    3. Talk to someone in your support network    4. Open up your depression survival kit           RED    ZONE Medical Alert - Get Help    What it looks like:     Depression is seriously interfering with your life.     You may experience these or other symptoms: You can t get out of bed most days, can t work or engage in other necessary activities, you have trouble taking care of basic hygiene, or basic responsibilities, thoughts of suicide or death that will not go away, self-injurious behavior.     What you need to do:  1. Call your care team  and request a same-day appointment. If they are not available (weekends or after hours) call your local crisis line, emergency room or 911.          Depression Self Care Plan / Survival Kit    Self-Care for Depression  Here s the deal. Your body and mind are really not as separate as most people think.  What you do and think affects how you feel and how you feel influences what you do and think. This means if you do things that people who feel good do, it will help you feel better.  Sometimes this is all it takes.  There is also a place for medication and therapy depending on how severe your depression is, so be sure to consult with your medical provider and/ or Behavioral Health Consultant if your symptoms are worsening or not improving.     In order to better manage my stress, I will:    Exercise  Get some form of exercise, every day. This will help reduce pain and release endorphins, the  feel good  chemicals in your brain. This is almost as good as taking antidepressants!  This is not the same as joining a gym and then never going! (they count on that by the way ) It can be as simple as just going for a walk or doing some gardening, anything that will get you moving.      Hygiene   Maintain good hygiene (Get out of bed in the morning, Make your bed, Brush your teeth, Take a shower, and Get dressed like you were going to work, even if you are unemployed).  If your clothes don't fit try to get ones that do.    Diet  I will strive to eat foods that are good for me, drink plenty of water, and avoid excessive sugar, caffeine, alcohol, and other mood-altering substances.  Some foods that are helpful in depression are: complex carbohydrates, B vitamins, flaxseed, fish or fish oil, fresh fruits and vegetables.    Psychotherapy  I agree to participate in Individual Therapy (if recommended).    Medication  If prescribed medications, I agree to take them.  Missing doses can result in serious side effects.  I understand  that drinking alcohol, or other illicit drug use, may cause potential side effects.  I will not stop my medication abruptly without first discussing it with my provider.    Staying Connected With Others  I will stay in touch with my friends, family members, and my primary care provider/team.    Use your imagination  Be creative.  We all have a creative side; it doesn t matter if it s oil painting, sand castles, or mud pies! This will also kick up the endorphins.    Witness Beauty  (AKA stop and smell the roses) Take a look outside, even in mid-winter. Notice colors, textures. Watch the squirrels and birds.     Service to others  Be of service to others.  There is always someone else in need.  By helping others we can  get out of ourselves  and remember the really important things.  This also provides opportunities for practicing all the other parts of the program.    Humor  Laugh and be silly!  Adjust your TV habits for less news and crime-drama and more comedy.    Control your stress  Try breathing deep, massage therapy, biofeedback, and meditation. Find time to relax each day.     My support system    Clinic Contact:  Phone number:    Contact 1:  Phone number:    Contact 2:  Phone number:    Mandaeism/:  Phone number:    Therapist:  Phone number:    Local crisis center:    Phone number:    Other community support:  Phone number:

## 2018-01-29 NOTE — PROGRESS NOTES
SUBJECTIVE:   Betina Tinsley is a 52 year old female who presents to clinic today for the following health issues:      ENT Symptoms             Symptoms: cc Present Absent Comment   Fever/Chills   x    Fatigue  x     Muscle Aches  x     Eye Irritation   x    Sneezing   x    Nasal Ernei/Drg  x     Sinus Pressure/Pain  x     Loss of smell  x  mild   Dental pain   x    Sore Throat  x  From clearing voice and coughing   Swollen Glands   x    Ear Pain/Fullness   x    Cough x x  Dry cough   Wheeze  x     Chest Pain  x  Some tightness, heart pounds and feels fluttering after minimal exertion   Shortness of breath  x  After minimal exertion   Rash   x    Other  x  Has fallen 4 times over the past month. Feels dizzy for a brief instant before she loses her balance. Feels like she has lost her center of gravity. Feels lightheaded and gets headaches daily. Memory has been poor.     Symptom duration:  2 weeks of upper respiratory symptoms   Symptom severity:  moderate/severe   Treatments tried:  Chiropractor    Contacts:   had cold but now is better         Problem list and histories reviewed & adjusted, as indicated.  Additional history: as documented    Current Outpatient Prescriptions   Medication Sig Dispense Refill     metoprolol succinate (TOPROL-XL) 50 MG 24 hr tablet Take 1 tablet (50 mg) by mouth daily 90 tablet 3     amoxicillin (AMOXIL) 500 MG capsule Take 2 capsules (1,000 mg) by mouth 2 times daily for 10 days 40 capsule 0     order for DME Trilok Ankle Brace 1 Device 0     metoprolol (TOPROL-XL) 100 MG 24 hr tablet Take 1 tablet (100 mg) by mouth daily 90 tablet 1     simvastatin (ZOCOR) 10 MG tablet TAKE 1 TABLET (10 MG) BY MOUTH AT BEDTIME 90 tablet 2     tolterodine (DETROL LA) 2 MG 24 hr capsule Take 1 capsule (2 mg) by mouth daily 90 capsule 3     LamoTRIgine (LAMICTAL PO) Take 150 mg by mouth daily       BusPIRone HCl (BUSPAR PO) Take 30 mg by mouth 2 times daily       Escitalopram Oxalate (LEXAPRO  PO) Take 20 mg by mouth daily       OMEPRAZOLE PO Take 20 mg by mouth daily       mometasone (NASONEX) 50 MCG/ACT nasal spray Spray 2 sprays into both nostrils daily       Zolpidem Tartrate (AMBIEN PO) Take 5-10 mg by mouth nightly as needed.         AMITRIPTYLINE HCL 50 MG PO TABS 1 TABLET AT BEDTIME       ciclopirox (LOPROX) 0.77 % cream Apply topically At Bedtime (Patient not taking: Reported on 1/29/2018) 90 g 3     desonide (DESOWEN) 0.05 % cream Apply sparingly to affected area three times daily as needed. (Patient not taking: Reported on 10/30/2017) 60 g 3     ketoconazole (NIZORAL) 2 % shampoo Apply to the affected area and wash off after 5 minutes. (Patient not taking: Reported on 10/30/2017) 240 mL 11     LORazepam (ATIVAN PO) Take 0.5 mg by mouth daily as needed for anxiety       Acetaminophen-Caffeine (EXCEDRIN QUICKTABS PO) Take 1-2 tablets by mouth at onset of headache.         Nutritional Supplements (MELATONIN PO) Take 5 mg by mouth At Bedtime.         Allergies   Allergen Reactions     Hydromorphone Rash       Reviewed and updated as needed this visit by clinical staff  Tobacco  Allergies  Meds  Med Hx  Surg Hx  Fam Hx  Soc Hx      Reviewed and updated as needed this visit by Provider           Has fallen 4 times in the last month. Does get some dizzy before falls. Feet do seem to get tangled up. Going up stairs did fall on all 4's. Ran into a door, lost balance. Has been having headaches daily. Headache is up shoulders to head. Eyes are no more sensitive to light. Has pain into ears. Has had times that wants to say stuff and has found self looking for words. Just feels very tired all the time. Is under a lot of stress with illness of  and then mother moving in a dying 9 months later. Has to walk a lot at work. Sees therapist. Just had eye exam. Dose of buspar, lexapro and amitriptyline are the same. However, did decrease buspar to as needed. Sees NP at [facility]. Does feel more sob.  "Notices that if goes up stairs will have more fluttering in chest than had in the past. Has not hit head when has fallen. Did fall on left shoulder. Has been able to move arms and legs. No cough when is eating.     Had head cold for the last 2 weeks. No fever that is aware of. Started in head and then seemed to move down.  Is more short of breath than had been.  Cough is nonproductive.  Does feel very tired and rundown.  Is going from house for 12 hours per day.  Is exposed to others at work that are ill.    ROS:  Review of Systems   Constitutional: Positive for fatigue. Negative for chills, diaphoresis and fever.   HENT: Positive for congestion, sinus pain and sore throat. Negative for ear discharge, ear pain, hearing loss, rhinorrhea and sinus pressure.    Eyes: Negative for discharge, itching and visual disturbance.   Respiratory: Positive for cough (dry), chest tightness, shortness of breath and wheezing.    Cardiovascular: Negative for chest pain, palpitations and leg swelling.   Gastrointestinal: Negative for abdominal distention, abdominal pain, constipation, diarrhea, nausea and vomiting.   Endocrine: Negative for cold intolerance and heat intolerance.   Musculoskeletal: Positive for myalgias. Negative for arthralgias.   Skin: Negative for rash.   Neurological: Positive for dizziness and headaches. Negative for light-headedness and numbness.         OBJECTIVE:     /72 (BP Location: Right arm, Patient Position: Right side, Cuff Size: Adult Large)  Pulse 78  Temp 97.4  F (36.3  C) (Tympanic)  Ht 5' 0.5\" (1.537 m)  Wt 207 lb 3.2 oz (94 kg)  LMP 12/01/2017  SpO2 97%  BMI 39.8 kg/m2  Body mass index is 39.8 kg/(m^2).  Physical Exam   Constitutional: She appears well-developed and well-nourished. She does not appear ill. No distress.   HENT:   Head: Normocephalic and atraumatic.   Right Ear: Hearing, tympanic membrane, external ear and ear canal normal. No middle ear effusion.   Left Ear: Hearing, " tympanic membrane, external ear and ear canal normal.  No middle ear effusion.   Nose: Nose normal. No mucosal edema. Right sinus exhibits no maxillary sinus tenderness and no frontal sinus tenderness. Left sinus exhibits no maxillary sinus tenderness and no frontal sinus tenderness.   Mouth/Throat: Uvula is midline and mucous membranes are normal. Posterior oropharyngeal erythema (mild) present.   Eyes: Conjunctivae, EOM and lids are normal. Pupils are equal, round, and reactive to light. No scleral icterus.   Neck: Trachea normal. Neck supple. Carotid bruit is not present. No thyromegaly present.   Cardiovascular: Normal rate, regular rhythm, S1 normal, S2 normal, normal heart sounds and intact distal pulses.  Exam reveals no gallop.    No murmur heard.  Pulmonary/Chest: Effort normal and breath sounds normal. No accessory muscle usage. No respiratory distress.   Abdominal: Soft. Normal appearance and bowel sounds are normal.   Lymphadenopathy:     She has no cervical adenopathy.   Neurological: She is alert. She has normal strength. No cranial nerve deficit or sensory deficit.   positive romberg     Skin: Skin is warm, dry and intact. No rash noted.   Psychiatric: She has a normal mood and affect. Her speech is normal and behavior is normal. Judgment and thought content normal. Cognition and memory are normal.   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        ASSESSMENT/PLAN:   1. Dysthymia  Continue to follow with mental health   - DEPRESSION ACTION PLAN (DAP)    2. Throat pain  - Rapid strep screen    3. Trigeminy  Plan to decrease metoprolol to 50 mg from 100 due to dizziness.  - metoprolol succinate (TOPROL-XL) 50 MG 24 hr tablet; Take 1 tablet (50 mg) by mouth daily  Dispense: 90 tablet; Refill: 3    4. Dizziness  Full plan will depend on outcome of tests.  - EKG 12-lead complete w/read - Clinics  - US Carotid Bilateral; Future  - MR  Brain w/o Contrast; Future    5. Nonintractable episodic headache, unspecified headache type  Educated regarding warning signs to watch for and when would need to seek further medical attention.  Push fluids  Try and decrease stress  Get plenty of rest  Take over the counter tylenol or ibuprofen as needed  Educated regarding warning signs to watch for.   - MR Brain w/o Contrast; Future    6. Upper respiratory tract infection, unspecified type  Reviewed treatment plan.   Reviewed fever and pain control with tylenol and/or ibuprofen  Instructed to call or return for:  --Symptoms not improved in the next 3 days  --Worsening symptom  --New unexplained symptoms develop     - amoxicillin (AMOXIL) 500 MG capsule; Take 2 capsules (1,000 mg) by mouth 2 times daily for 10 days  Dispense: 40 capsule; Refill: 0  - Beta strep group A culture        ROBBIN Delcid [facility]

## 2018-01-29 NOTE — LETTER
January 30, 2018          Betina Tinsley  [address] [address]        The results of your 24 hour throat culture were negative.  Please contact your clinic if you have any questions or concerns.            Sincerely,        ROBBIN Delcid CNP

## 2018-01-29 NOTE — MR AVS SNAPSHOT
"              After Visit Summary   1/29/2018    Betina Tinsley    MRN: [id number]           Patient Information     Date Of Birth          1965        Visit Information        Provider Department      1/29/2018 11:20 AM Madeleine Wells APRN CNP [facility]        Today's Diagnoses     Dysthymia    -  1    Throat pain        Trigeminy        Dizziness        Nonintractable episodic headache, unspecified headache type        Upper respiratory tract infection, unspecified type           Follow-ups after your visit        Future tests that were ordered for you today     Open Future Orders        Priority Expected Expires Ordered    US Carotid Bilateral Routine  1/29/2019 1/29/2018    MR Brain w/o Contrast Routine  1/29/2019 1/29/2018            Who to contact     Normal or non-critical lab and imaging results will be communicated to you by [employer], letter or phone within 4 business days after the clinic has received the results. If you do not hear from us within 7 days, please contact the clinic through [employer] or phone. If you have a critical or abnormal lab result, we will notify you by phone as soon as possible.  Submit refill requests through [employer] or call your pharmacy and they will forward the refill request to us. Please allow 3 business days for your refill to be completed.          If you need to speak with a  for additional information , please call: [phone]           Additional Information About Your Visit        [employer] Information     [employer] lets you send messages to your doctor, view your test results, renew your prescriptions, schedule appointments and more. To sign up, go to [email or web address] . Click on \"Log in\" on the left side of the screen, which will take you to the Welcome page. Then click on \"Sign up Now\" on the right side of the page.     You will be asked to enter the access code listed below, as well as some personal information. " "Please follow the directions to create your username and password.     Your access code is: [email or web address]  Expires: 2018 11:41 AM     Your access code will  in 90 days. If you need help or a new code, please call your [facility] clinic or [phone].        Care EveryWhere ID     This is your Care EveryWhere ID. This could be used by other organizations to access your [facility] medical records  [id number]        Your Vitals Were     Pulse Temperature Height Last Period Pulse Oximetry BMI (Body Mass Index)    78 97.4  F (36.3  C) (Tympanic) 5' 0.5\" (1.537 m) 2017 97% 39.8 kg/m2       Blood Pressure from Last 3 Encounters:   18 120/72   10/30/17 98/62   17 118/68    Weight from Last 3 Encounters:   18 207 lb 3.2 oz (94 kg)   10/30/17 202 lb (91.6 kg)   17 199 lb 3.2 oz (90.4 kg)              We Performed the Following     DEPRESSION ACTION PLAN (DAP)     EKG 12-lead complete w/read - Clinics     Rapid strep screen          Today's Medication Changes          These changes are accurate as of 18 11:41 AM.  If you have any questions, ask your nurse or doctor.               Start taking these medicines.        Dose/Directions    amoxicillin 500 MG capsule   Commonly known as:  AMOXIL   Used for:  Upper respiratory tract infection, unspecified type   Started by:  Madeleine Wells APRN CNP        Dose:  1000 mg   Take 2 capsules (1,000 mg) by mouth 2 times daily for 10 days   Quantity:  40 capsule   Refills:  0         These medicines have changed or have updated prescriptions.        Dose/Directions    * metoprolol succinate 100 MG 24 hr tablet   Commonly known as:  TOPROL-XL   This may have changed:  Another medication with the same name was added. Make sure you understand how and when to take each.   Used for:  Benign essential hypertension, Trigeminy   Changed by:  Madeleine Wells APRN CNP        Dose:  100 mg   Take 1 tablet (100 mg) by mouth daily   Quantity: "  90 tablet   Refills:  1       * metoprolol succinate 50 MG 24 hr tablet   Commonly known as:  TOPROL-XL   This may have changed:  You were already taking a medication with the same name, and this prescription was added. Make sure you understand how and when to take each.   Used for:  Trigeminy   Changed by:  Madeleine Wells APRN CNP        Dose:  50 mg   Take 1 tablet (50 mg) by mouth daily   Quantity:  90 tablet   Refills:  3       * Notice:  This list has 2 medication(s) that are the same as other medications prescribed for you. Read the directions carefully, and ask your doctor or other care provider to review them with you.         Where to get your medicines      These medications were sent to [place] [address] [place] [address] [address]     Phone:  [phone]     amoxicillin 500 MG capsule    metoprolol succinate 50 MG 24 hr tablet                Primary Care Provider Office Phone # Fax #    ROBBIN Ren [phone]       [facility] [address] [place] [address]   [facility] [address]        Equal Access to Services     JULIO TELLO AH: Hadii osiel jolly hadasho Soomaali, waaxda luqadaha, qaybta kaalmada adeegyada, siddhartha baires. So [facility] [phone].    ATENCIÓN: Si habla español, tiene a tsang disposición servicios gratuitos de asistencia lingüística. [facility] [phone].    We comply with applicable federal civil rights laws and [place] laws. We do not discriminate on the basis of race, color, national origin, age, disability, sex, sexual orientation, or gender identity.            Thank you!     Thank you for choosing [facility]  for your care. Our goal is always to provide you with excellent care. Hearing back from our patients is one way we can continue to improve our services. Please take a few minutes to complete the written survey that you may receive in the mail after  your visit with us. Thank you!             Your Updated Medication List - Protect others around you: Learn how to safely use, store and throw away your medicines at www.disposemymeds.org.          This list is accurate as of 1/29/18 11:41 AM.  Always use your most recent med list.                   Brand Name Dispense Instructions for use Diagnosis    AMBIEN PO      Take 5-10 mg by mouth nightly as needed.        amitriptyline 50 MG tablet    ELAVIL     1 TABLET AT BEDTIME        amoxicillin 500 MG capsule    AMOXIL    40 capsule    Take 2 capsules (1,000 mg) by mouth 2 times daily for 10 days    Upper respiratory tract infection, unspecified type       ATIVAN PO      Take 0.5 mg by mouth daily as needed for anxiety        BUSPAR PO      Take 30 mg by mouth 2 times daily        ciclopirox 0.77 % cream    LOPROX    90 g    Apply topically At Bedtime    Senile sebaceous gland hyperplasia, Dermatitis, seborrheic, Lentigo, SK (seborrheic keratosis)       desonide 0.05 % cream    DESOWEN    60 g    Apply sparingly to affected area three times daily as needed.    Senile sebaceous gland hyperplasia, Dermatitis, seborrheic, Lentigo, SK (seborrheic keratosis)       EXCEDRIN QUICKTABS PO      Take 1-2 tablets by mouth at onset of headache.        ketoconazole 2 % shampoo    NIZORAL    240 mL    Apply to the affected area and wash off after 5 minutes.    Senile sebaceous gland hyperplasia, Dermatitis, seborrheic, Lentigo, SK (seborrheic keratosis)       LAMICTAL PO      Take 150 mg by mouth daily        LEXAPRO PO      Take 20 mg by mouth daily        MELATONIN PO      Take 5 mg by mouth At Bedtime.        * metoprolol succinate 100 MG 24 hr tablet    TOPROL-XL    90 tablet    Take 1 tablet (100 mg) by mouth daily    Benign essential hypertension, Trigeminy       * metoprolol succinate 50 MG 24 hr tablet    TOPROL-XL    90 tablet    Take 1 tablet (50 mg) by mouth daily    Trigeminy       mometasone 50 MCG/ACT spray    NASONEX      Spray 2 sprays into both nostrils daily        OMEPRAZOLE PO      Take 20 mg by mouth daily        order for DME     1 Device    Trilok Ankle Brace    Posterior tibial tendonitis, right       simvastatin 10 MG tablet    ZOCOR    90 tablet    TAKE 1 TABLET (10 MG) BY MOUTH AT BEDTIME    Hyperlipidemia LDL goal <100       tolterodine 2 MG 24 hr capsule    DETROL LA    90 capsule    Take 1 capsule (2 mg) by mouth daily    Urinary frequency       * Notice:  This list has 2 medication(s) that are the same as other medications prescribed for you. Read the directions carefully, and ask your doctor or other care provider to review them with you.

## 2018-01-30 LAB
BACTERIA SPEC CULT: NORMAL
SPECIMEN SOURCE: NORMAL

## 2018-01-30 ASSESSMENT — PATIENT HEALTH QUESTIONNAIRE - PHQ9: SUM OF ALL RESPONSES TO PHQ QUESTIONS 1-9: 8

## 2018-01-30 ASSESSMENT — ANXIETY QUESTIONNAIRES: GAD7 TOTAL SCORE: 8

## 2018-02-19 ENCOUNTER — HOSPITAL ENCOUNTER (OUTPATIENT)
Dept: MRI IMAGING | Facility: CLINIC | Age: 53
End: 2018-02-19
Attending: NURSE PRACTITIONER
Payer: COMMERCIAL

## 2018-02-19 ENCOUNTER — HOSPITAL ENCOUNTER (OUTPATIENT)
Dept: ULTRASOUND IMAGING | Facility: CLINIC | Age: 53
Discharge: HOME | End: 2018-02-19
Attending: NURSE PRACTITIONER | Admitting: NURSE PRACTITIONER
Payer: COMMERCIAL

## 2018-02-19 DIAGNOSIS — R42 DIZZINESS: ICD-10-CM

## 2018-02-19 DIAGNOSIS — R51.9 NONINTRACTABLE EPISODIC HEADACHE, UNSPECIFIED HEADACHE TYPE: ICD-10-CM

## 2018-02-19 PROCEDURE — 70551 MRI BRAIN STEM W/O DYE: CPT

## 2018-02-19 PROCEDURE — 93880 EXTRACRANIAL BILAT STUDY: CPT

## 2018-02-20 ENCOUNTER — TELEPHONE (OUTPATIENT)
Dept: FAMILY MEDICINE | Facility: CLINIC | Age: 53
End: 2018-02-20

## 2018-02-20 NOTE — TELEPHONE ENCOUNTER
Patient advised and expressed understanding. She will call us on Friday with an update.    Radha Leyva RN

## 2018-02-20 NOTE — TELEPHONE ENCOUNTER
Patient called and said Priscilla lower her BP med and she thinks her BP is a little higher and she is having rapid heart rates.    Anita Parish [facility]

## 2018-02-20 NOTE — TELEPHONE ENCOUNTER
She may try 75 mg daily, or 1 and 1/2 tablets of the 50 mg metoprolol pills. They should be scored and easy to cut in half. See if her symptoms improve. Update us on Friday.

## 2018-02-20 NOTE — TELEPHONE ENCOUNTER
"On 1/29/18, patient's metoprolol was decreased to 50 mg daily from 100 mg. She is still feeling jittery and light headed. She feels \"off\". Last week her pulse was between 100-116, /86, 130/99. She is wondering if her medication needs to be adjusted again. Please advise in Madeleine's absence. Thank you.  Catie Benson RN    "

## 2018-03-29 DIAGNOSIS — I10 BENIGN ESSENTIAL HYPERTENSION: ICD-10-CM

## 2018-03-29 DIAGNOSIS — R00.8 TRIGEMINY: ICD-10-CM

## 2018-03-29 RX ORDER — METOPROLOL SUCCINATE 100 MG/1
100 TABLET, EXTENDED RELEASE ORAL DAILY
Qty: 90 TABLET | Refills: 1 | Status: SHIPPED | OUTPATIENT
Start: 2018-03-29 | End: 2018-09-18

## 2018-03-29 NOTE — TELEPHONE ENCOUNTER
"Requested Prescriptions   Pending Prescriptions Disp Refills     metoprolol succinate (TOPROL-XL) 100 MG 24 hr tablet 90 tablet 1    Last Written Prescription Date:  10/30/2017 #90 x 1  Last filled - not provided  Last office visit: 1/29/2018 EVETTE Wells   Future Office Visit:  None   Sig: Take 1 tablet (100 mg) by mouth daily    Beta-Blockers Protocol Passed    3/29/2018 12:41 PM       Passed - Blood pressure under 140/90 in past 12 months    BP Readings from Last 3 Encounters:   01/29/18 120/72   10/30/17 98/62   05/31/17 118/68                Passed - Patient is age 6 or older       Passed - Recent (12 mo) or future (30 days) visit within the authorizing provider's specialty    Patient had office visit in the last 12 months or has a visit in the next 30 days with authorizing provider or within the authorizing provider's specialty.  See \"Patient Info\" tab in inbasket, or \"Choose Columns\" in Meds & Orders section of the refill encounter.              "

## 2018-03-29 NOTE — TELEPHONE ENCOUNTER
Prescription approved per [facility] [place] Protocol or patient Primary care provider (PCP)  DOMINIQUE Saini RN/Eliel Mcnulty

## 2018-04-25 ENCOUNTER — OFFICE VISIT (OUTPATIENT)
Dept: FAMILY MEDICINE | Facility: CLINIC | Age: 53
End: 2018-04-25
Payer: COMMERCIAL

## 2018-04-25 VITALS
HEIGHT: 61 IN | WEIGHT: 207.8 LBS | BODY MASS INDEX: 39.23 KG/M2 | DIASTOLIC BLOOD PRESSURE: 75 MMHG | TEMPERATURE: 98.6 F | SYSTOLIC BLOOD PRESSURE: 105 MMHG

## 2018-04-25 DIAGNOSIS — M76.62 ACHILLES TENDONITIS, BILATERAL: ICD-10-CM

## 2018-04-25 DIAGNOSIS — M76.61 ACHILLES TENDONITIS, BILATERAL: ICD-10-CM

## 2018-04-25 DIAGNOSIS — N30.01 ACUTE CYSTITIS WITH HEMATURIA: Primary | ICD-10-CM

## 2018-04-25 LAB
ALBUMIN UR-MCNC: NEGATIVE MG/DL
APPEARANCE UR: CLEAR
BILIRUB UR QL STRIP: NEGATIVE
COLOR UR AUTO: YELLOW
GLUCOSE UR STRIP-MCNC: NEGATIVE MG/DL
HGB UR QL STRIP: ABNORMAL
KETONES UR STRIP-MCNC: NEGATIVE MG/DL
LEUKOCYTE ESTERASE UR QL STRIP: ABNORMAL
NITRATE UR QL: NEGATIVE
NON-SQ EPI CELLS #/AREA URNS LPF: NORMAL /LPF
PH UR STRIP: 6.5 PH (ref 5–7)
RBC #/AREA URNS AUTO: NORMAL /HPF
SOURCE: ABNORMAL
SP GR UR STRIP: 1.01 (ref 1–1.03)
UROBILINOGEN UR STRIP-ACNC: 0.2 EU/DL (ref 0.2–1)
WBC #/AREA URNS AUTO: NORMAL /HPF

## 2018-04-25 PROCEDURE — 81001 URINALYSIS AUTO W/SCOPE: CPT | Performed by: PHYSICIAN ASSISTANT

## 2018-04-25 PROCEDURE — 99213 OFFICE O/P EST LOW 20 MIN: CPT | Performed by: PHYSICIAN ASSISTANT

## 2018-04-25 RX ORDER — CYCLOBENZAPRINE HCL 10 MG
5-10 TABLET ORAL 3 TIMES DAILY PRN
Qty: 30 TABLET | Refills: 1 | Status: SHIPPED | OUTPATIENT
Start: 2018-04-25

## 2018-04-25 RX ORDER — SULFAMETHOXAZOLE/TRIMETHOPRIM 800-160 MG
1 TABLET ORAL 2 TIMES DAILY
Qty: 6 TABLET | Refills: 0 | Status: SHIPPED | OUTPATIENT
Start: 2018-04-25 | End: 2018-04-28

## 2018-04-25 NOTE — NURSING NOTE
"Chief Complaint   Patient presents with     UTI       Initial /75  Temp 98.6  F (37  C) (Tympanic)  Ht 5' 0.5\" (1.537 m)  Wt 207 lb 12.8 oz (94.3 kg)  Breastfeeding? No  BMI 39.92 kg/m2 Estimated body mass index is 39.92 kg/(m^2) as calculated from the following:    Height as of this encounter: 5' 0.5\" (1.537 m).    Weight as of this encounter: 207 lb 12.8 oz (94.3 kg).  Medication Reconciliation: complete     Shanita Hernandes MA      "

## 2018-04-25 NOTE — PROGRESS NOTES
SUBJECTIVE:   Betina Tinsley is a 53 year old female who presents to clinic today for the following health issues:      URINARY TRACT SYMPTOMS  Onset: 1week    Description:   Painful urination (Dysuria): no (feels hotter coming out, not burning)  Blood in urine (Hematuria): no   Delay in urine (Hesitency): YES    Intensity: mild    Progression of Symptoms:  same    Accompanying Signs & Symptoms:  Fever/chills: no   Flank pain no   Nausea and vomiting: no   Any vaginal symptoms: none  Abdominal/Pelvic Pain: no     History:   History of frequent UTI's: no   History of kidney stones: no   Sexually Active: YES  Possibility of pregnancy: No    Precipitating factors:   None     Therapies Tried and outcome: None     Pt still having pain in hips and feet. She has been to [facility] and has done all the recommended treatment and it is still bothering her.     Problem list and histories reviewed & adjusted, as indicated.  Additional history: as documented    Patient Active Problem List   Diagnosis     Urinary incontinence     Benign essential hypertension     Mixed hyperlipidemia     Trigeminy     Urinary frequency     Dysthymia     Past Surgical History:   Procedure Laterality Date     LAPAROSCOPIC APPENDECTOMY  10/18/2012    Procedure: LAPAROSCOPIC APPENDECTOMY;  Laparoscopic Appendectomy;  Surgeon: Gutierrez Gauthier MD;  Location: [facility] OR       Social History   Substance Use Topics     Smoking status: Never Smoker     Smokeless tobacco: Never Used     Alcohol use No     Family History   Problem Relation Age of Onset     Dementia Mother      Myocardial Infarction Father      CANCER Father      skin         Current Outpatient Prescriptions   Medication Sig Dispense Refill     Acetaminophen-Caffeine (EXCEDRIN QUICKTABS PO) Take 1-2 tablets by mouth at onset of headache.         AMITRIPTYLINE HCL 50 MG PO TABS 1 TABLET AT BEDTIME       BusPIRone HCl (BUSPAR PO) Take 30 mg by mouth 2 times daily       ciclopirox (LOPROX) 0.77 %  "cream Apply topically At Bedtime (Patient not taking: Reported on 1/29/2018) 90 g 3     cyclobenzaprine (FLEXERIL) 10 MG tablet Take 0.5-1 tablets (5-10 mg) by mouth 3 times daily as needed for muscle spasms 30 tablet 1     desonide (DESOWEN) 0.05 % cream Apply sparingly to affected area three times daily as needed. (Patient not taking: Reported on 10/30/2017) 60 g 3     Escitalopram Oxalate (LEXAPRO PO) Take 20 mg by mouth daily       ketoconazole (NIZORAL) 2 % shampoo Apply to the affected area and wash off after 5 minutes. (Patient not taking: Reported on 10/30/2017) 240 mL 11     LamoTRIgine (LAMICTAL PO) Take 150 mg by mouth daily       LORazepam (ATIVAN PO) Take 0.5 mg by mouth daily as needed for anxiety       metoprolol succinate (TOPROL-XL) 100 MG 24 hr tablet Take 1 tablet (100 mg) by mouth daily 90 tablet 1     mometasone (NASONEX) 50 MCG/ACT nasal spray Spray 2 sprays into both nostrils daily       Nutritional Supplements (MELATONIN PO) Take 5 mg by mouth At Bedtime.         OMEPRAZOLE PO Take 20 mg by mouth daily       order for DME Trilok Ankle Brace 1 Device 0     simvastatin (ZOCOR) 10 MG tablet TAKE 1 TABLET (10 MG) BY MOUTH AT BEDTIME 90 tablet 2     tolterodine (DETROL LA) 2 MG 24 hr capsule Take 1 capsule (2 mg) by mouth daily 90 capsule 3     Zolpidem Tartrate (AMBIEN PO) Take 5-10 mg by mouth nightly as needed.         BP Readings from Last 3 Encounters:   04/25/18 105/75   01/29/18 120/72   10/30/17 98/62    Wt Readings from Last 3 Encounters:   04/25/18 207 lb 12.8 oz (94.3 kg)   01/29/18 207 lb 3.2 oz (94 kg)   10/30/17 202 lb (91.6 kg)                    Reviewed and updated as needed this visit by clinical staff       Reviewed and updated as needed this visit by Provider         ROS:  Constitutional, HEENT, cardiovascular, pulmonary, gi and gu systems are negative, except as otherwise noted.    OBJECTIVE:     /75  Temp 98.6  F (37  C) (Tympanic)  Ht 5' 0.5\" (1.537 m)  Wt 207 lb " 12.8 oz (94.3 kg)  Breastfeeding? No  BMI 39.92 kg/m2  Body mass index is 39.92 kg/(m^2).  GENERAL: healthy, alert and no distress  Abdomen:  Soft, nontender, bowel sounds present in all four quadrants. No rigidity or guarding.  No hepatosplenomegaly noted.   Foot:  No pain over plantar fascia.  Tenderness over bilateral achilles tendons. Pain with flexion/extension of feet.  Negative Bello's sign.     Diagnostic Test Results:  Results for orders placed or performed in visit on 04/25/18   UA reflex to Microscopic and Culture   Result Value Ref Range    Color Urine Yellow     Appearance Urine Clear     Glucose Urine Negative NEG^Negative mg/dL    Bilirubin Urine Negative NEG^Negative    Ketones Urine Negative NEG^Negative mg/dL    Specific Gravity Urine 1.010 1.003 - 1.035    Blood Urine Small (A) NEG^Negative    pH Urine 6.5 5.0 - 7.0 pH    Protein Albumin Urine Negative NEG^Negative mg/dL    Urobilinogen Urine 0.2 0.2 - 1.0 EU/dL    Nitrite Urine Negative NEG^Negative    Leukocyte Esterase Urine Trace (A) NEG^Negative    Source Midstream Urine    Urine Microscopic   Result Value Ref Range    WBC Urine 0 - 5 OTO5^0 - 5 /HPF    RBC Urine O - 2 OTO2^O - 2 /HPF    Squamous Epithelial /LPF Urine Few FEW^Few /LPF       ASSESSMENT/PLAN:         1. Acute cystitis with hematuria  UTI uncomplicated without evidence of pyelonephritis    Urine analysis noted and in chart.   Treatment per orders -  patient to increase fluids/cranberry juice. Call or return to clinic prn if these symptoms worsen or fail to improve as anticipated.     - UA reflex to Microscopic and Culture  - sulfamethoxazole-trimethoprim (BACTRIM DS/SEPTRA DS) 800-160 MG per tablet; Take 1 tablet by mouth 2 times daily for 3 days  Dispense: 6 tablet; Refill: 0    2. Achilles tendonitis, bilateral  Will try a muscle relaxer, follow back up with [facility] for a recheck.   - cyclobenzaprine (FLEXERIL) 10 MG tablet; Take 0.5-1 tablets (5-10 mg) by mouth 3 times  daily as needed for muscle spasms  Dispense: 30 tablet; Refill: 1    FUTURE APPOINTMENTS:       - Follow-up visit with [facility] to recheck foot.     Zayra Guevara PA-C  [facility]

## 2018-04-25 NOTE — MR AVS SNAPSHOT
"              After Visit Summary   2018    Betina Tinsley    MRN: [id number]           Patient Information     Date Of Birth          1965        Visit Information        Provider Department      2018 3:20 PM Zayra Guevara PA-C [facility]        Today's Diagnoses     Acute cystitis with hematuria    -  1    Achilles tendonitis, bilateral          Care Instructions    Follow-up with Tria for your continued pain.           Follow-ups after your visit        Who to contact     Normal or non-critical lab and imaging results will be communicated to you by ValenTxhart, letter or phone within 4 business days after the clinic has received the results. If you do not hear from us within 7 days, please contact the clinic through ValenTxhart or phone. If you have a critical or abnormal lab result, we will notify you by phone as soon as possible.  Submit refill requests through Kites or call your pharmacy and they will forward the refill request to us. Please allow 3 business days for your refill to be completed.          If you need to speak with a  for additional information , please call: [phone]           Additional Information About Your Visit        [employer] Information     Kites lets you send messages to your doctor, view your test results, renew your prescriptions, schedule appointments and more. To sign up, go to [email or web address] . Click on \"Log in\" on the left side of the screen, which will take you to the Welcome page. Then click on \"Sign up Now\" on the right side of the page.     You will be asked to enter the access code listed below, as well as some personal information. Please follow the directions to create your username and password.     Your access code is: [email or web address]  Expires: 2018 12:41 PM     Your access code will  in 90 days. If you need help or a new code, please call your [place] clinic or [phone].        Care EveryWhere ID  " "   This is your Care EveryWhere ID. This could be used by other organizations to access your [place] medical records  [id number]        Your Vitals Were     Temperature Height Breastfeeding? BMI (Body Mass Index)          98.6  F (37  C) (Tympanic) 5' 0.5\" (1.537 m) No 39.92 kg/m2         Blood Pressure from Last 3 Encounters:   04/25/18 105/75   01/29/18 120/72   10/30/17 98/62    Weight from Last 3 Encounters:   04/25/18 207 lb 12.8 oz (94.3 kg)   01/29/18 207 lb 3.2 oz (94 kg)   10/30/17 202 lb (91.6 kg)              We Performed the Following     UA reflex to Microscopic and Culture     Urine Microscopic          Today's Medication Changes          These changes are accurate as of 4/25/18  3:59 PM.  If you have any questions, ask your nurse or doctor.               Start taking these medicines.        Dose/Directions    cyclobenzaprine 10 MG tablet   Commonly known as:  FLEXERIL   Used for:  Achilles tendonitis, bilateral   Started by:  Zayra Guevara PA-C        Dose:  5-10 mg   Take 0.5-1 tablets (5-10 mg) by mouth 3 times daily as needed for muscle spasms   Quantity:  30 tablet   Refills:  1       sulfamethoxazole-trimethoprim 800-160 MG per tablet   Commonly known as:  BACTRIM DS/SEPTRA DS   Used for:  Acute cystitis with hematuria   Started by:  Zayra Guevara PA-C        Dose:  1 tablet   Take 1 tablet by mouth 2 times daily for 3 days   Quantity:  6 tablet   Refills:  0         These medicines have changed or have updated prescriptions.        Dose/Directions    metoprolol succinate 100 MG 24 hr tablet   Commonly known as:  TOPROL-XL   This may have changed:  Another medication with the same name was removed. Continue taking this medication, and follow the directions you see here.   Used for:  Benign essential hypertension, Trigeminy   Changed by:  Zayra Guevara PA-C        Dose:  100 mg   Take 1 tablet (100 mg) by mouth daily   Quantity:  90 tablet   Refills:  1            Where to " get your medicines      These medications were sent to [facility] [address] [facility] - [place], [place] - [address]  [address] [address]     Phone:  [phone]     cyclobenzaprine 10 MG tablet    sulfamethoxazole-trimethoprim 800-160 MG per tablet                Primary Care Provider Office Phone # Fax #    Madeleine Wells, APRN [phone]       [facility] [address]   [place] [facility] [address]        Equal Access to Services     JULIO TELLO : Hadii aad ku hadasho Soomaali, waaxda luqadaha, qaybta kaalmada adeegyada, waxay idiin hayaan bri leyva . So [facility] [phone].    ATENCIÓN: Si habla español, tiene a tsang disposición servicios gratuitos de asistencia lingüística. Llame al [phone].    We comply with applicable federal civil rights laws and [place] laws. We do not discriminate on the basis of race, color, national origin, age, disability, sex, sexual orientation, or gender identity.            Thank you!     Thank you for choosing [facility]  for your care. Our goal is always to provide you with excellent care. Hearing back from our patients is one way we can continue to improve our services. Please take a few minutes to complete the written survey that you may receive in the mail after your visit with us. Thank you!             Your Updated Medication List - Protect others around you: Learn how to safely use, store and throw away your medicines at www.disposemymeds.org.          This list is accurate as of 4/25/18  3:59 PM.  Always use your most recent med list.                   Brand Name Dispense Instructions for use Diagnosis    AMBIEN PO      Take 5-10 mg by mouth nightly as needed.        amitriptyline 50 MG tablet    ELAVIL     1 TABLET AT BEDTIME        ATIVAN PO      Take 0.5 mg by mouth daily as needed for anxiety        BUSPAR PO      Take 30 mg by mouth 2 times daily        ciclopirox 0.77 % cream    LOPROX     90 g    Apply topically At Bedtime    Senile sebaceous gland hyperplasia, Dermatitis, seborrheic, Lentigo, SK (seborrheic keratosis)       cyclobenzaprine 10 MG tablet    FLEXERIL    30 tablet    Take 0.5-1 tablets (5-10 mg) by mouth 3 times daily as needed for muscle spasms    Achilles tendonitis, bilateral       desonide 0.05 % cream    DESOWEN    60 g    Apply sparingly to affected area three times daily as needed.    Senile sebaceous gland hyperplasia, Dermatitis, seborrheic, Lentigo, SK (seborrheic keratosis)       EXCEDRIN QUICKTABS PO      Take 1-2 tablets by mouth at onset of headache.        ketoconazole 2 % shampoo    NIZORAL    240 mL    Apply to the affected area and wash off after 5 minutes.    Senile sebaceous gland hyperplasia, Dermatitis, seborrheic, Lentigo, SK (seborrheic keratosis)       LAMICTAL PO      Take 150 mg by mouth daily        LEXAPRO PO      Take 20 mg by mouth daily        MELATONIN PO      Take 5 mg by mouth At Bedtime.        metoprolol succinate 100 MG 24 hr tablet    TOPROL-XL    90 tablet    Take 1 tablet (100 mg) by mouth daily    Benign essential hypertension, Trigeminy       mometasone 50 MCG/ACT spray    NASONEX     Spray 2 sprays into both nostrils daily        OMEPRAZOLE PO      Take 20 mg by mouth daily        order for DME     1 Device    Trilok Ankle Brace    Posterior tibial tendonitis, right       simvastatin 10 MG tablet    ZOCOR    90 tablet    TAKE 1 TABLET (10 MG) BY MOUTH AT BEDTIME    Hyperlipidemia LDL goal <100       sulfamethoxazole-trimethoprim 800-160 MG per tablet    BACTRIM DS/SEPTRA DS    6 tablet    Take 1 tablet by mouth 2 times daily for 3 days    Acute cystitis with hematuria       tolterodine 2 MG 24 hr capsule    DETROL LA    90 capsule    Take 1 capsule (2 mg) by mouth daily    Urinary frequency

## 2018-05-04 DIAGNOSIS — R35.0 URINARY FREQUENCY: ICD-10-CM

## 2018-05-04 NOTE — TELEPHONE ENCOUNTER
"Requested Prescriptions   Pending Prescriptions Disp Refills     tolterodine (DETROL LA) 2 MG 24 hr capsule [Pharmacy Med Name: TOLTERODINE TART ER 2 MG CAP] 90 capsule 3    Last Written Prescription Date:  03/13/2017 #90 x 3  Last filled 02/04/2018  Last office visit: 4/25/2018 EVETTE Guevara   Future Office Visit:  None   Sig: TAKE 1 CAPSULE (2 MG) BY MOUTH DAILY    Muscarinic Antagonists (Urinary Incontinence Agents) Passed    5/4/2018  3:54 AM       Passed - Recent (12 mo) or future (30 days) visit within the authorizing provider's specialty    Patient had office visit in the last 12 months or has a visit in the next 30 days with authorizing provider or within the authorizing provider's specialty.  See \"Patient Info\" tab in inbasket, or \"Choose Columns\" in Meds & Orders section of the refill encounter.           Passed - Patient does not have a diagnosis of glaucoma on the problem list    If glaucoma diagnosis is new, refer refill to physician.         Passed - Patient is 18 years of age or older          "

## 2018-05-08 RX ORDER — TOLTERODINE 2 MG/1
CAPSULE, EXTENDED RELEASE ORAL
Qty: 90 CAPSULE | Refills: 1 | Status: SHIPPED | OUTPATIENT
Start: 2018-05-08 | End: 2018-10-31

## 2018-05-08 NOTE — TELEPHONE ENCOUNTER
"Requested Prescriptions   Signed Prescriptions Disp Refills     tolterodine (DETROL LA) 2 MG 24 hr capsule 90 capsule 1     Sig: TAKE 1 CAPSULE (2 MG) BY MOUTH DAILY    Muscarinic Antagonists (Urinary Incontinence Agents) Passed    5/4/2018 11:30 AM       Passed - Recent (12 mo) or future (30 days) visit within the authorizing provider's specialty    Patient had office visit in the last 12 months or has a visit in the next 30 days with authorizing provider or within the authorizing provider's specialty.  See \"Patient Info\" tab in inbasket, or \"Choose Columns\" in Meds & Orders section of the refill encounter.           Passed - Patient does not have a diagnosis of glaucoma on the problem list    If glaucoma diagnosis is new, refer refill to physician.         Passed - Patient is 18 years of age or older        Prescription approved per [facility] Refill Protocol.    Rayne NARANJO Rn    "

## 2018-05-11 ENCOUNTER — TELEPHONE (OUTPATIENT)
Dept: FAMILY MEDICINE | Facility: CLINIC | Age: 53
End: 2018-05-11

## 2018-05-11 DIAGNOSIS — N30.00 ACUTE CYSTITIS WITHOUT HEMATURIA: Primary | ICD-10-CM

## 2018-05-11 RX ORDER — CIPROFLOXACIN 500 MG/1
500 TABLET, FILM COATED ORAL 2 TIMES DAILY
Qty: 10 TABLET | Refills: 0 | Status: SHIPPED | OUTPATIENT
Start: 2018-05-11 | End: 2018-05-16

## 2018-05-11 NOTE — TELEPHONE ENCOUNTER
"Spoke with patient regarding symptoms.  Patient was seen and treated with Bactrim on 4/25/18 for acute cystitis with hematuria. Patient reports that symptoms resolved for about 5 days after antibiotic treatment. Today she is experiencing \"pressure and urgency.\" Denies dysuria, denies hematuria. Reports some \"mild/minor low back discomfort\" that she is unsure if it is related to urinary symptoms or not.  Routing to Zayra Guevara to review and advise whether or not patient needs to come back in today for a re-check, try another round of antibiotics or be seen in urgent care/ED due to accompanying low back pain. Will call patient back with recommendations.    Apple Tinsley RN    "

## 2018-05-11 NOTE — TELEPHONE ENCOUNTER
Patient says she just got done with an UTI and thinks its coming back.    Anita Parish, [facility]

## 2018-05-11 NOTE — TELEPHONE ENCOUNTER
Please call Betina,   I have sent a script of cipro to the pharmacy.  Take this BID x 5 days. F/u if no improvement.  F/u in ER if she has fever, flank pain and vomiting.   Zayra Guevara PA-C

## 2018-05-11 NOTE — TELEPHONE ENCOUNTER
I called Betina and let her know there is a prescription for her and when to follow-up. Nataliia Fair RN

## 2018-06-04 DIAGNOSIS — E78.5 HYPERLIPIDEMIA LDL GOAL <100: ICD-10-CM

## 2018-06-04 NOTE — TELEPHONE ENCOUNTER
"Requested Prescriptions   Pending Prescriptions Disp Refills     simvastatin (ZOCOR) 10 MG tablet [Pharmacy Med Name: SIMVASTATIN 10 MG TABLET] 90 tablet 2    Last Written Prescription Date:  08/29/2017 #90 x 2  Last filled 03/07/2018  Last office visit: 4/25/2018 EVETTE Guevara   Future Office Visit:  None   Sig: TAKE 1 TABLET (10 MG) BY MOUTH AT BEDTIME    Statins Protocol Passed    6/4/2018  1:42 AM       Passed - LDL on file in past 12 months    Recent Labs   Lab Test  10/30/17   0835   LDL  96            Passed - No abnormal creatine kinase in past 12 months    No lab results found.            Passed - Recent (12 mo) or future (30 days) visit within the authorizing provider's specialty    Patient had office visit in the last 12 months or has a visit in the next 30 days with authorizing provider or within the authorizing provider's specialty.  See \"Patient Info\" tab in inbasket, or \"Choose Columns\" in Meds & Orders section of the refill encounter.           Passed - Patient is age 18 or older       Passed - No active pregnancy on record       Passed - No positive pregnancy test in past 12 months          "

## 2018-06-05 RX ORDER — SIMVASTATIN 10 MG
TABLET ORAL
Qty: 90 TABLET | Refills: 1 | Status: SHIPPED | OUTPATIENT
Start: 2018-06-05 | End: 2018-06-13

## 2018-06-05 NOTE — TELEPHONE ENCOUNTER
Prescription approved per [facility] Refill Protocol.  Catie Benson RN

## 2018-06-12 ENCOUNTER — TELEPHONE (OUTPATIENT)
Dept: FAMILY MEDICINE | Facility: CLINIC | Age: 53
End: 2018-06-12

## 2018-06-13 DIAGNOSIS — E78.5 HYPERLIPIDEMIA LDL GOAL <100: ICD-10-CM

## 2018-06-13 RX ORDER — SIMVASTATIN 10 MG
TABLET ORAL
Qty: 90 TABLET | Refills: 1 | Status: SHIPPED | OUTPATIENT
Start: 2018-06-13 | End: 2019-06-06

## 2018-06-13 NOTE — TELEPHONE ENCOUNTER
Reason for Call:  Other call back    Detailed comments: PER PT pharmacy called PT .. didnt get med refill for med simvastatin     Phone Number Patient can be reached at: Cell number on file:    Telephone Information:   Mobile [phone]       Best Time: anytime    Can we leave a detailed message on this number? YES    Call taken on 6/12/2018 at 8:42 PM by Brandee Solorzano prescription     Madeleine Wells NP-C

## 2018-08-13 ENCOUNTER — OFFICE VISIT (OUTPATIENT)
Dept: FAMILY MEDICINE | Facility: CLINIC | Age: 53
End: 2018-08-13
Payer: COMMERCIAL

## 2018-08-13 VITALS
BODY MASS INDEX: 39.48 KG/M2 | SYSTOLIC BLOOD PRESSURE: 126 MMHG | DIASTOLIC BLOOD PRESSURE: 80 MMHG | WEIGHT: 209.13 LBS | HEIGHT: 61 IN | HEART RATE: 66 BPM

## 2018-08-13 DIAGNOSIS — I10 BENIGN ESSENTIAL HYPERTENSION: ICD-10-CM

## 2018-08-13 DIAGNOSIS — M76.61 ACHILLES TENDINITIS, RIGHT LEG: ICD-10-CM

## 2018-08-13 DIAGNOSIS — R00.8 TRIGEMINY: ICD-10-CM

## 2018-08-13 DIAGNOSIS — E66.01 MORBID OBESITY (H): ICD-10-CM

## 2018-08-13 DIAGNOSIS — Z01.818 PREOP GENERAL PHYSICAL EXAM: Primary | ICD-10-CM

## 2018-08-13 LAB
ANION GAP SERPL CALCULATED.3IONS-SCNC: 7 MMOL/L (ref 3–14)
BUN SERPL-MCNC: 20 MG/DL (ref 7–30)
CALCIUM SERPL-MCNC: 8.3 MG/DL (ref 8.5–10.1)
CHLORIDE SERPL-SCNC: 104 MMOL/L (ref 94–109)
CO2 SERPL-SCNC: 26 MMOL/L (ref 20–32)
CREAT SERPL-MCNC: 0.7 MG/DL (ref 0.52–1.04)
ERYTHROCYTE [DISTWIDTH] IN BLOOD BY AUTOMATED COUNT: 12.9 % (ref 10–15)
GFR SERPL CREATININE-BSD FRML MDRD: 87 ML/MIN/1.7M2
GLUCOSE SERPL-MCNC: 106 MG/DL (ref 70–99)
HCT VFR BLD AUTO: 40.3 % (ref 35–47)
HGB BLD-MCNC: 13.5 G/DL (ref 11.7–15.7)
MCH RBC QN AUTO: 29.4 PG (ref 26.5–33)
MCHC RBC AUTO-ENTMCNC: 33.5 G/DL (ref 31.5–36.5)
MCV RBC AUTO: 88 FL (ref 78–100)
PLATELET # BLD AUTO: 237 10E9/L (ref 150–450)
POTASSIUM SERPL-SCNC: 4.1 MMOL/L (ref 3.4–5.3)
RBC # BLD AUTO: 4.59 10E12/L (ref 3.8–5.2)
SODIUM SERPL-SCNC: 137 MMOL/L (ref 133–144)
WBC # BLD AUTO: 6.1 10E9/L (ref 4–11)

## 2018-08-13 PROCEDURE — 99214 OFFICE O/P EST MOD 30 MIN: CPT | Performed by: FAMILY MEDICINE

## 2018-08-13 PROCEDURE — 36415 COLL VENOUS BLD VENIPUNCTURE: CPT | Performed by: FAMILY MEDICINE

## 2018-08-13 PROCEDURE — 80048 BASIC METABOLIC PNL TOTAL CA: CPT | Performed by: FAMILY MEDICINE

## 2018-08-13 PROCEDURE — 85027 COMPLETE CBC AUTOMATED: CPT | Performed by: FAMILY MEDICINE

## 2018-08-13 NOTE — PROGRESS NOTES
[facility]  [address] [address]  [phone]  Dept: [phone]    PRE-OP EVALUATION:  Today's date: 2018    Betina Tinsley (: 1965) presents for pre-operative evaluation assessment.  She requires evaluation and anesthesia risk assessment prior to undergoing surgery/procedure for treatment of right foot .    Proposed Surgery/ Procedure: achilles tendon surgery  Date of Surgery/ Procedure: 2018  Time of Surgery/ Procedure: 1:30PM  Hospital/Surgical Facility: [facility]  Fax number for surgical facility: [phone]  Primary Physician: Madeleine Wells  Type of Anesthesia Anticipated: General    Patient has a Health Care Directive or Living Will:  YES     1. NO - Do you have a history of heart attack, stroke, stent, bypass or surgery on an artery in the head, neck, heart or legs?  2. NO - Do you ever have any pain or discomfort in your chest?  3. NO - Do you have a history of  Heart Failure?  4. NO - Are you troubled by shortness of breath when: walking on the level, up a slight hill or at night?  5. NO - Do you currently have a cold, bronchitis or other respiratory infection?  6. NO - Do you have a cough, shortness of breath or wheezing?  7. NO - Do you sometimes get pains in the calves of your legs when you walk?  8. NO - Do you or anyone in your family have previous history of blood clots?  9. NO - Do you or does anyone in your family have a serious bleeding problem such as prolonged bleeding following surgeries or cuts?  10. NO - Have you ever had problems with anemia or been told to take iron pills?  11. NO - Have you had any abnormal blood loss such as black, tarry or bloody stools, or abnormal vaginal bleeding?  12. YES - Have you ever had a blood transfusion? As an infant. No sequela.   13. NO - Have you or any of your relatives ever had problems with anesthesia?  14. NO - Do you have sleep apnea, excessive snoring or daytime drowsiness?  15. NO -  Do you have any prosthetic heart valves?  16. NO - Do you have prosthetic joints?  17. NO - Is there any chance that you may be pregnant?      HPI:     HPI related to upcoming procedure: chronic heel pain, duration years, worse over several months. Now affects daily activities such as walking. Failed conservative treatment including physical therapy. Planned surgical intervention.       See problem list for active medical problems.  Problems all longstanding and stable, except as noted/documented.  See ROS for pertinent symptoms related to these conditions.                                                                                                                                                          .    MEDICAL HISTORY:     Patient Active Problem List    Diagnosis Date Noted     Dysthymia 10/30/2017     Priority: Medium     Urinary incontinence 03/13/2017     Priority: Medium     Benign essential hypertension 03/13/2017     Priority: Medium     Mixed hyperlipidemia 03/13/2017     Priority: Medium     Trigeminy 03/13/2017     Priority: Medium     Urinary frequency 03/13/2017     Priority: Medium      History reviewed. No pertinent past medical history.  Past Surgical History:   Procedure Laterality Date     LAPAROSCOPIC APPENDECTOMY  10/18/2012    Procedure: LAPAROSCOPIC APPENDECTOMY;  Laparoscopic Appendectomy;  Surgeon: Gutierrez Gauthier MD;  Location: [facility] OR     Current Outpatient Prescriptions   Medication Sig Dispense Refill     Acetaminophen-Caffeine (EXCEDRIN QUICKTABS PO) Take 1-2 tablets by mouth at onset of headache.         AMITRIPTYLINE HCL 50 MG PO TABS 1 TABLET AT BEDTIME       BusPIRone HCl (BUSPAR PO) Take 30 mg by mouth 2 times daily       ciclopirox (LOPROX) 0.77 % cream Apply topically At Bedtime (Patient not taking: Reported on 1/29/2018) 90 g 3     cyclobenzaprine (FLEXERIL) 10 MG tablet Take 0.5-1 tablets (5-10 mg) by mouth 3 times daily as needed for muscle spasms 30 tablet 1      "desonide (DESOWEN) 0.05 % cream Apply sparingly to affected area three times daily as needed. (Patient not taking: Reported on 10/30/2017) 60 g 3     Escitalopram Oxalate (LEXAPRO PO) Take 20 mg by mouth daily       ketoconazole (NIZORAL) 2 % shampoo Apply to the affected area and wash off after 5 minutes. (Patient not taking: Reported on 10/30/2017) 240 mL 11     LamoTRIgine (LAMICTAL PO) Take 150 mg by mouth daily       LORazepam (ATIVAN PO) Take 0.5 mg by mouth daily as needed for anxiety       metoprolol succinate (TOPROL-XL) 100 MG 24 hr tablet Take 1 tablet (100 mg) by mouth daily 90 tablet 1     mometasone (NASONEX) 50 MCG/ACT nasal spray Spray 2 sprays into both nostrils daily       Nutritional Supplements (MELATONIN PO) Take 5 mg by mouth At Bedtime.         OMEPRAZOLE PO Take 20 mg by mouth daily       order for DME Trilok Ankle Brace 1 Device 0     simvastatin (ZOCOR) 10 MG tablet TAKE 1 TABLET (10 MG) BY MOUTH AT BEDTIME 90 tablet 1     tolterodine (DETROL LA) 2 MG 24 hr capsule TAKE 1 CAPSULE (2 MG) BY MOUTH DAILY 90 capsule 1     Zolpidem Tartrate (AMBIEN PO) Take 5-10 mg by mouth nightly as needed.         OTC products: none    Allergies   Allergen Reactions     Hydromorphone Rash      Latex Allergy: NO    Social History   Substance Use Topics     Smoking status: Never Smoker     Smokeless tobacco: Never Used     Alcohol use No     History   Drug Use No       REVIEW OF SYSTEMS:   Constitutional, HEENT, cardiovascular, pulmonary, gi and gu systems are negative, except as otherwise noted.    EXAM:   /80  Pulse 66  Ht 5' 0.5\" (1.537 m)  Wt 209 lb 2 oz (94.9 kg)  BMI 40.17 kg/m2  GENERAL: Healthy, alert and no distress  EYES: Eyes grossly normal to inspection, conjunctivae and sclerae normal  Heent: TM's clear, no facial pain, oral mucosa moist, posterior pharynx without erythema or exudate. Teeth in good dentition.   RESP: Lungs clear to auscultation - no rales, rhonchi or wheezes  CV: Regular " rate and rhythm, normal S1 S2, no murmur  GI:  soft, nontender, no HSM   PSY: alert, pleasant, mood and affect appropriate.   MS: there is tenderness to palpation over the insertion of the right achilles tendon.    DIAGNOSTICS:   EKG: from 1/29/2018: appears sinus to me, normal axis, normal intervals, no acute ST/T changes c/w ischemia, no LVH by voltage criteria, no acute ischemic changes. No QT prolongation.     Recent Labs   Lab Test  10/30/17   0835  03/16/16   1335   HGB  12.8  14.1   PLT  249  289   NA  136  138   POTASSIUM  3.8  3.8   CR  0.82  0.67      Results for orders placed or performed in visit on 08/13/18   CBC with platelets   Result Value Ref Range    WBC 6.1 4.0 - 11.0 10e9/L    RBC Count 4.59 3.8 - 5.2 10e12/L    Hemoglobin 13.5 11.7 - 15.7 g/dL    Hematocrit 40.3 35.0 - 47.0 %    MCV 88 78 - 100 fl    MCH 29.4 26.5 - 33.0 pg    MCHC 33.5 31.5 - 36.5 g/dL    RDW 12.9 10.0 - 15.0 %    Platelet Count 237 150 - 450 10e9/L   Basic metabolic panel  (Ca, Cl, CO2, Creat, Gluc, K, Na, BUN)   Result Value Ref Range    Sodium 137 133 - 144 mmol/L    Potassium 4.1 3.4 - 5.3 mmol/L    Chloride 104 94 - 109 mmol/L    Carbon Dioxide 26 20 - 32 mmol/L    Anion Gap 7 3 - 14 mmol/L    Glucose 106 (H) 70 - 99 mg/dL    Urea Nitrogen 20 7 - 30 mg/dL    Creatinine 0.70 0.52 - 1.04 mg/dL    GFR Estimate 87 >60 mL/min/1.7m2    GFR Estimate If Black >90 >60 mL/min/1.7m2    Calcium 8.3 (L) 8.5 - 10.1 mg/dL      IMPRESSION:       The proposed surgical procedure is considered INTERMEDIATE risk.    REVISED CARDIAC RISK INDEX  The patient has the following serious cardiovascular risks for perioperative complications such as (MI, PE, VFib and 3  AV Block):  No serious cardiac risks  INTERPRETATION: 0 risks: Class I (very low risk - 0.4% complication rate)    The patient has the following additional risks for perioperative complications:  Morbid obesity    (Z01.818) Preop general physical exam  (primary encounter  diagnosis)  Comment: clearance given.   Plan: CBC with platelets, Basic metabolic panel  (Ca,        Cl, CO2, Creat, Gluc, K, Na, BUN)       (M76.61) Achilles tendinitis, right leg  Comment: failed conservative treatments.      (E66.01) Morbid obesity (H)  Comment: reviewed lifestyle, limited secondary to heel pain.       (R00.8) Trigeminy  Comment: denies any recent episodes of irregular heartbeats.   Plan: continue  beta blocker therapy.     (I10) Benign essential hypertension  Comment: within guide lines.         RECOMMENDATIONS:         --Patient is to take all scheduled medications on the day of surgery EXCEPT for modifications listed below.    None.     Anticoagulant or Antiplatelet Medication Use  ASPIRIN: Discontinue ASA 7-10 days prior to procedure to reduce bleeding risk.  It should be resumed post-operatively.  NSAIDS: Ibuprofen (Motrin):         Stop one day prior to surgery        APPROVAL GIVEN to proceed with proposed procedure, without further diagnostic evaluation       Signed Electronically by: Josephine Corbett MD    Copy of this evaluation report is provided to requesting physician.    [facility] Preop Guidelines    Revised Cardiac Risk Index

## 2018-08-13 NOTE — MR AVS SNAPSHOT
After Visit Summary   8/13/2018    Betina Tinsley    MRN: [id number]           Patient Information     Date Of Birth          1965        Visit Information        Provider Department      8/13/2018 8:20 AM Josephine Corbett MD [facility]        Today's Diagnoses     Preop general physical exam    -  1    Achilles tendinitis, right leg        Morbid obesity (H)        Trigeminy        Benign essential hypertension          Care Instructions      Before Your Surgery      Call your surgeon if there is any change in your health. This includes signs of a cold or flu (such as a sore throat, runny nose, cough, rash or fever).    Do not smoke, drink alcohol or take over the counter medicine (unless your surgeon or primary care doctor tells you to) for the 24 hours before and after surgery.    If you take prescribed drugs: Follow your doctor s orders about which medicines to take and which to stop until after surgery.    Eating and drinking prior to surgery: follow the instructions from your surgeon    Take a shower or bath the night before surgery. Use the soap your surgeon gave you to gently clean your skin. If you do not have soap from your surgeon, use your regular soap. Do not shave or scrub the surgery site.  Wear clean pajamas and have clean sheets on your bed.     *   Stop aspirin, ibuprofen, Aleve, or Excedrin one week before surgery.           Follow-ups after your visit        Who to contact     Normal or non-critical lab and imaging results will be communicated to you by [facility], letter or phone within 4 business days after the clinic has received the results. If you do not hear from us within 7 days, please contact the clinic through MyChart or phone. If you have a critical or abnormal lab result, we will notify you by phone as soon as possible.  Submit refill requests through [employer] or call your pharmacy and they will forward the refill request to us. Please allow 3 business  "days for your refill to be completed.          If you need to speak with a  for additional information , please call: [phone]           Additional Information About Your Visit        Care EveryWhere ID     This is your Care EveryWhere ID. This could be used by other organizations to access your [place] medical records  [id number]        Your Vitals Were     Pulse Height BMI (Body Mass Index)             66 5' 0.5\" (1.537 m) 40.17 kg/m2          Blood Pressure from Last 3 Encounters:   08/13/18 126/80   04/25/18 105/75   01/29/18 120/72    Weight from Last 3 Encounters:   08/13/18 209 lb 2 oz (94.9 kg)   04/25/18 207 lb 12.8 oz (94.3 kg)   01/29/18 207 lb 3.2 oz (94 kg)              We Performed the Following     Basic metabolic panel  (Ca, Cl, CO2, Creat, Gluc, K, Na, BUN)     CBC with platelets        Primary Care Provider Office Phone # Fax #    ROBBIN Ren [phone]       [facility] [address]   [place] [address]        Equal Access to Services     UJLIO TELLO AH: Hadii aad ku hadasho Sohomeroali, waaxda luqadaha, qaybta kaalmada adeegyada, siddhartha yangn bri leyva ah. So [facility] [phone].    ATENCIÓN: Si habla español, tiene a tsang disposición servicios gratuitos de asistencia lingüística. [facility] [phone].    We comply with applicable federal civil rights laws and [place] laws. We do not discriminate on the basis of race, color, national origin, age, disability, sex, sexual orientation, or gender identity.            Thank you!     Thank you for choosing [facility]  for your care. Our goal is always to provide you with excellent care. Hearing back from our patients is one way we can continue to improve our services. Please take a few minutes to complete the written survey that you may receive in the mail after your visit with us. Thank you!             Your Updated Medication List - Protect " others around you: Learn how to safely use, store and throw away your medicines at www.disposemymeds.org.          This list is accurate as of 8/13/18  5:36 PM.  Always use your most recent med list.                   Brand Name Dispense Instructions for use Diagnosis    AMBIEN PO      Take 5-10 mg by mouth nightly as needed.        amitriptyline 50 MG tablet    ELAVIL     1 TABLET AT BEDTIME        ATIVAN PO      Take 0.5 mg by mouth daily as needed for anxiety        BUSPAR PO      Take 30 mg by mouth 2 times daily        ciclopirox 0.77 % cream    LOPROX    90 g    Apply topically At Bedtime    Senile sebaceous gland hyperplasia, Dermatitis, seborrheic, Lentigo, SK (seborrheic keratosis)       cyclobenzaprine 10 MG tablet    FLEXERIL    30 tablet    Take 0.5-1 tablets (5-10 mg) by mouth 3 times daily as needed for muscle spasms    Achilles tendonitis, bilateral       desonide 0.05 % cream    DESOWEN    60 g    Apply sparingly to affected area three times daily as needed.    Senile sebaceous gland hyperplasia, Dermatitis, seborrheic, Lentigo, SK (seborrheic keratosis)       EXCEDRIN QUICKTABS PO      Take 1-2 tablets by mouth at onset of headache.        ketoconazole 2 % shampoo    NIZORAL    240 mL    Apply to the affected area and wash off after 5 minutes.    Senile sebaceous gland hyperplasia, Dermatitis, seborrheic, Lentigo, SK (seborrheic keratosis)       LAMICTAL PO      Take 150 mg by mouth daily        LEXAPRO PO      Take 20 mg by mouth daily        MELATONIN PO      Take 5 mg by mouth At Bedtime.        metoprolol succinate 100 MG 24 hr tablet    TOPROL-XL    90 tablet    Take 1 tablet (100 mg) by mouth daily    Benign essential hypertension, Trigeminy       mometasone 50 MCG/ACT spray    NASONEX     Spray 2 sprays into both nostrils daily        OMEPRAZOLE PO      Take 20 mg by mouth daily        order for DME     1 Device    Trilok Ankle Brace    Posterior tibial tendonitis, right       simvastatin 10  MG tablet    ZOCOR    90 tablet    TAKE 1 TABLET (10 MG) BY MOUTH AT BEDTIME    Hyperlipidemia LDL goal <100       tolterodine 2 MG 24 hr capsule    DETROL LA    90 capsule    TAKE 1 CAPSULE (2 MG) BY MOUTH DAILY    Urinary frequency

## 2018-08-13 NOTE — PATIENT INSTRUCTIONS
Before Your Surgery      Call your surgeon if there is any change in your health. This includes signs of a cold or flu (such as a sore throat, runny nose, cough, rash or fever).    Do not smoke, drink alcohol or take over the counter medicine (unless your surgeon or primary care doctor tells you to) for the 24 hours before and after surgery.    If you take prescribed drugs: Follow your doctor s orders about which medicines to take and which to stop until after surgery.    Eating and drinking prior to surgery: follow the instructions from your surgeon    Take a shower or bath the night before surgery. Use the soap your surgeon gave you to gently clean your skin. If you do not have soap from your surgeon, use your regular soap. Do not shave or scrub the surgery site.  Wear clean pajamas and have clean sheets on your bed.     *   Stop aspirin, ibuprofen, Aleve, or Excedrin one week before surgery.

## 2018-09-18 DIAGNOSIS — R00.8 TRIGEMINY: ICD-10-CM

## 2018-09-18 DIAGNOSIS — I10 BENIGN ESSENTIAL HYPERTENSION: ICD-10-CM

## 2018-09-18 RX ORDER — METOPROLOL SUCCINATE 100 MG/1
TABLET, EXTENDED RELEASE ORAL
Qty: 90 TABLET | Refills: 1 | Status: SHIPPED | OUTPATIENT
Start: 2018-09-18 | End: 2019-03-11

## 2018-09-18 NOTE — TELEPHONE ENCOUNTER
"Requested Prescriptions   Pending Prescriptions Disp Refills     metoprolol succinate (TOPROL-XL) 100 MG 24 hr tablet [Pharmacy Med Name: METOPROLOL SUCC  MG TAB] 90 tablet 1    Last Written Prescription Date:  03/29/2018 #90 x 1  Last filled 06/24/2018  Last office visit: 8/13/2018 EVETTE Corbett   Future Office Visit:  None   Sig: TAKE 1 TABLET (100 MG) BY MOUTH DAILY    Beta-Blockers Protocol Passed    9/18/2018  1:52 AM       Passed - Blood pressure under 140/90 in past 12 months    BP Readings from Last 3 Encounters:   08/13/18 126/80   04/25/18 105/75   01/29/18 120/72                Passed - Patient is age 6 or older       Passed - Recent (12 mo) or future (30 days) visit within the authorizing provider's specialty    Patient had office visit in the last 12 months or has a visit in the next 30 days with authorizing provider or within the authorizing provider's specialty.  See \"Patient Info\" tab in inbasket, or \"Choose Columns\" in Meds & Orders section of the refill encounter.              "

## 2018-09-18 NOTE — TELEPHONE ENCOUNTER
Prescription approved per [facility] Refill Protocol.  Apple Tinsley RN

## 2018-10-02 ENCOUNTER — THERAPY VISIT (OUTPATIENT)
Dept: PHYSICAL THERAPY | Facility: CLINIC | Age: 53
End: 2018-10-02
Payer: COMMERCIAL

## 2018-10-02 DIAGNOSIS — S86.019A ACHILLES TENDON TEAR: Primary | ICD-10-CM

## 2018-10-02 PROCEDURE — 97110 THERAPEUTIC EXERCISES: CPT | Mod: GP | Performed by: PHYSICAL THERAPIST

## 2018-10-02 PROCEDURE — 97161 PT EVAL LOW COMPLEX 20 MIN: CPT | Mod: GP | Performed by: PHYSICAL THERAPIST

## 2018-10-02 NOTE — PROGRESS NOTES
[place] [facility] Initial Evaluation  Subjective:  Patient is a 53 year old female presenting with rehab right ankle/foot hpi. The history is provided by the patient and the spouse.   Betina Tinsley is a 53 year old female with a right ankle condition.  Condition occurred with:  Degenerative joint disease and repetition/overuse.    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 Pt is not s/p 7 weeks from surgery. Pt notes most pain along posterior calcaneus with about a 1 inch incision. Pain is mostly from incision, pain involved with condition prior to surgery is not noticed much anymore. Has stairs to get in/out of house with railings. Currently mobilizing on a scooter, but will start walking with crutches today. Lives with  who can help take care of her. Also notes R hip pain especially with weight bearing situations..    Patient reports pain:  Posterior.    Pain is described as aching and is constant and reported as 3/10.            Previous treatment includes surgery (R achilles tendon partial repair with yumiko green 8/17/18).      Pertinent medical history includes:  High blood pressure, overweight, depression and menopausal.      Current medications:  High blood pressure medication and anti-depressants.  Current occupation is Works in filing room sitting down for work, doesn't have to stand at all if she doesn't need to..  Patient is currently not working due to present treatment problem.  Primary job tasks include:  Prolonged sitting.                                Objective:    Gait:  Has scooter and walking boot.  Weight Bearing Status:  WBAT   Assistive Devices:  Crutches            Ankle/Foot Evaluation  ROM:    AROM:    Dorsiflexion:  Left:   50  Right:   42  Plantarflexion:  Left:  5    Right:  -1  Inversion: Left:      Right:  35  Eversion:     Right:  15        Strength wnl ankle: 4+/5 to 5/5 grossly in hips and  knees for active strength.        EDEMA:   Left ankle edema present at: 49 cm  Right ankle edema present at:  50 cm      Figure 8 left: 49 cmFigure 8 right: 50 cm                                                          General Evaluation:        Lower Extremity Strength:  normal            Palpation:  Palpation wnl general: Normal temp in calf/ankle, no redness or notable swelling noted.    Sensation:  Sensation wnl general: Intact to light touch R foot.        Integumentary/Inspection:  Integumentary inspection wnl general: Incision healing well.                                                   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    Patient has the following significant findings with corresponding treatment plan.                Diagnosis 1:  S/p R achilles debridement and reattachment  Pain -  hot/cold therapy, US, electric stimulation, mechanical traction, manual therapy, splint/taping/bracing/orthotics, self management, education, directional preference exercise and home program  Decreased ROM/flexibility - manual therapy, therapeutic exercise and home program  Decreased joint mobility - manual therapy and therapeutic exercise and HEP  Decreased strength - therapeutic exercise, therapeutic activities and home program  Impaired balance - neuro re-education, therapeutic activities and home program  Decreased proprioception - neuro re-education, therapeutic activities and home program  Edema - vasopneumatics, electric stimulation, cold therapy, cryocuff and self management/home program  Impaired gait - gait training, assistive devices and home program  Impaired muscle performance - electric stimulation, neuro re-education and home program  Decreased function - therapeutic activities and home program    Therapy Evaluation Codes:   1) History comprised of:   Personal factors  that impact the plan of care:      None.    Comorbidity factors that impact the plan of care are:      Depression, High blood pressure, Menopausal and Overweight.     Medications impacting care: Anti-depressant, High blood pressure and Sleep.  2) Examination of Body Systems comprised of:   Body structures and functions that impact the plan of care:      Ankle.   Activity limitations that impact the plan of care are:      Lifting, Stairs, Standing and Walking.  3) Clinical presentation characteristics are:   Stable/Uncomplicated.  4) Decision-Making    Low complexity using standardized patient assessment instrument and/or measureable assessment of functional outcome.  Cumulative Therapy Evaluation is: Low complexity.    Previous and current functional limitations:  (See Goal Flow Sheet for this information)    Short term and Long term goals: (See Goal Flow Sheet for this information)     Communication ability:  Patient appears to be able to clearly communicate and understand verbal and written communication and follow directions correctly.  Treatment Explanation - The following has been discussed with the patient:   RX ordered/plan of care  Anticipated outcomes  Possible risks and side effects  This patient would benefit from PT intervention to resume normal activities.   Rehab potential is good.    Frequency:  2 X week, once daily  Duration:  for 6 weeks  Discharge Plan:  Achieve all LTG.  Independent in home treatment program.  Reach maximal therapeutic benefit.    Please refer to the daily flowsheet for treatment today, total treatment time and time spent performing 1:1 timed codes.

## 2018-10-02 NOTE — LETTER
[facility]  [address] [address]  [phone]    2018    Re: Betina Tinsley   :   1965  MRN:  [id number]   REFERRING PHYSICIAN:   Shar Dang    [facility]    Date of Initial Evaluation:  10/2/2018  Visits:  Rxs Used: 1  Reason for Referral:  Achilles tendon tear    EVALUATION SUMMARY    [place] [facility] Initial Evaluation  Subjective:  Patient is a 53 year old female presenting with rehab right ankle/foot hpi. The history is provided by the patient and the spouse.   Betina Tinsley is a 53 year old female with a right ankle condition.  Condition occurred with:  Degenerative joint disease and repetition/overuse.    This is a new condition  Surgery on R achilles tendon for debridement for Hagland's deformity on 18. Pt is not s/p 7 weeks from surgery. Pt notes most pain along posterior calcaneus with about a 1 inch incision. Pain is mostly from incision, pain involved with condition prior to surgery is not noticed much anymore. Has stairs to get in/out of house with railings. Currently mobilizing on a scooter, but will start walking with crutches today. Lives with  who can help take care of her. Also notes R hip pain especially with weight bearing situations..  Patient reports pain:  Posterior.    Pain is described as aching and is constant and reported as 3/10.            Previous treatment includes surgery (R achilles tendon partial repair with yumiko debreidment 18).      Pertinent medical history includes:  High blood pressure, overweight, depression and menopausal.      Current medications:  High blood pressure medication and anti-depressants.  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  Patient is currently not working due to present treatment problem.  Primary job tasks include:  Prolonged sitting.    Objective:  Gait:   Has scooter and walking boot.  Weight Bearing Status:  WBAT   Assistive Devices:  Crutches  Ankle/Foot Evaluation  ROM:    AROM:    Dorsiflexion:  Left:   50  Right:   42  Plantarflexion:  Left:  5    Right:  -1  Inversion: Left:      Right:  35  Eversion:     Right:  15  Strength wnl ankle: 4+/5 to 5/5 grossly in hips and knees for active strength.  EDEMA:   Left ankle edema present at: 49 cm  Right ankle edema present at:  50 cm  Figure 8 left: 49 cmFigure 8 right: 50 cm  General Evaluation:  Lower Extremity Strength:  normal  Palpation:  Palpation wnl general: Normal temp in calf/ankle, no redness or notable swelling noted.  Sensation:  Sensation wnl general: Intact to light touch R foot.  Integumentary/Inspection:  Integumentary inspection wnl general: Incision healing well.    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    Patient has the following significant findings with corresponding treatment plan.                Diagnosis 1:  S/p R achilles debridement and reattachment  Pain -  hot/cold therapy, US, electric stimulation, mechanical traction, manual therapy, splint/taping/bracing/orthotics, self management, education, directional preference exercise and home program  Decreased ROM/flexibility - manual therapy, therapeutic exercise and home program  Decreased joint mobility - manual therapy and therapeutic exercise and HEP  Decreased strength - therapeutic exercise, therapeutic activities and home program  Impaired balance - neuro re-education, therapeutic activities and home program  Decreased proprioception - neuro re-education, therapeutic activities and home program  Edema - vasopneumatics, electric stimulation, cold therapy, cryocuff and self management/home program  Impaired gait - gait training, assistive devices and home program  Impaired muscle performance - electric  stimulation, neuro re-education and home program  Decreased function - therapeutic activities and home program    Therapy Evaluation Codes:   1) History comprised of:   Personal factors that impact the plan of care:      None.    Comorbidity factors that impact the plan of care are:      Depression, High blood pressure, Menopausal and Overweight.     Medications impacting care: Anti-depressant, High blood pressure and Sleep.  2) Examination of Body Systems comprised of:   Body structures and functions that impact the plan of care:      Ankle.   Activity limitations that impact the plan of care are:      Lifting, Stairs, Standing and Walking.  3) Clinical presentation characteristics are:   Stable/Uncomplicated.  4) Decision-Making    Low complexity using standardized patient assessment instrument and/or measureable assessment of functional outcome.  Cumulative Therapy Evaluation is: Low complexity.    Previous and current functional limitations:  (See Goal Flow Sheet for this information)    Short term and Long term goals: (See Goal Flow Sheet for this information)     Communication ability:  Patient appears to be able to clearly communicate and understand verbal and written communication and follow directions correctly.  Treatment Explanation - The following has been discussed with the patient:   RX ordered/plan of care  Anticipated outcomes  Possible risks and side effects  This patient would benefit from PT intervention to resume normal activities.   Rehab potential is good.    Frequency:  2 X week, once daily  Duration:  for 6 weeks  Discharge Plan:  Achieve all LTG.  Independent in home treatment program.  Reach maximal therapeutic benefit.    Thank you for your referral.    INQUIRIES  Therapist: SATISH Muñoz [facility]  [address] [address]  Phone: [phone]  Fax: [phone]

## 2018-10-05 ENCOUNTER — THERAPY VISIT (OUTPATIENT)
Dept: PHYSICAL THERAPY | Facility: CLINIC | Age: 53
End: 2018-10-05
Payer: COMMERCIAL

## 2018-10-05 DIAGNOSIS — S86.019A ACHILLES TENDON TEAR: ICD-10-CM

## 2018-10-05 PROCEDURE — 97140 MANUAL THERAPY 1/> REGIONS: CPT | Mod: GP | Performed by: PHYSICAL THERAPIST

## 2018-10-05 PROCEDURE — 97110 THERAPEUTIC EXERCISES: CPT | Mod: GP | Performed by: PHYSICAL THERAPIST

## 2018-10-05 PROCEDURE — 97530 THERAPEUTIC ACTIVITIES: CPT | Mod: GP | Performed by: PHYSICAL THERAPIST

## 2018-10-12 ENCOUNTER — THERAPY VISIT (OUTPATIENT)
Dept: PHYSICAL THERAPY | Facility: CLINIC | Age: 53
End: 2018-10-12
Payer: COMMERCIAL

## 2018-10-12 DIAGNOSIS — S86.019A ACHILLES TENDON TEAR: ICD-10-CM

## 2018-10-12 PROCEDURE — 97112 NEUROMUSCULAR REEDUCATION: CPT | Mod: GP | Performed by: PHYSICAL THERAPIST

## 2018-10-12 PROCEDURE — 97110 THERAPEUTIC EXERCISES: CPT | Mod: GP | Performed by: PHYSICAL THERAPIST

## 2018-10-15 ENCOUNTER — OFFICE VISIT (OUTPATIENT)
Dept: FAMILY MEDICINE | Facility: CLINIC | Age: 53
End: 2018-10-15
Payer: COMMERCIAL

## 2018-10-15 VITALS
TEMPERATURE: 98.8 F | DIASTOLIC BLOOD PRESSURE: 74 MMHG | WEIGHT: 209.8 LBS | SYSTOLIC BLOOD PRESSURE: 112 MMHG | OXYGEN SATURATION: 100 % | BODY MASS INDEX: 40.3 KG/M2 | HEART RATE: 85 BPM

## 2018-10-15 DIAGNOSIS — R35.0 URINARY FREQUENCY: Primary | ICD-10-CM

## 2018-10-15 DIAGNOSIS — Z87.442 H/O RENAL CALCULI: ICD-10-CM

## 2018-10-15 DIAGNOSIS — S86.011S RUPTURE OF RIGHT ACHILLES TENDON, SEQUELA: ICD-10-CM

## 2018-10-15 DIAGNOSIS — N30.01 ACUTE CYSTITIS WITH HEMATURIA: ICD-10-CM

## 2018-10-15 LAB
ALBUMIN UR-MCNC: NEGATIVE MG/DL
APPEARANCE UR: CLEAR
BACTERIA #/AREA URNS HPF: ABNORMAL /HPF
BILIRUB UR QL STRIP: NEGATIVE
COLOR UR AUTO: YELLOW
GLUCOSE UR STRIP-MCNC: NEGATIVE MG/DL
HGB UR QL STRIP: ABNORMAL
KETONES UR STRIP-MCNC: NEGATIVE MG/DL
LEUKOCYTE ESTERASE UR QL STRIP: ABNORMAL
NITRATE UR QL: NEGATIVE
NON-SQ EPI CELLS #/AREA URNS LPF: ABNORMAL /LPF
PH UR STRIP: 7 PH (ref 5–7)
RBC #/AREA URNS AUTO: ABNORMAL /HPF
SOURCE: ABNORMAL
SP GR UR STRIP: 1.02 (ref 1–1.03)
UROBILINOGEN UR STRIP-ACNC: 0.2 EU/DL (ref 0.2–1)
WBC #/AREA URNS AUTO: ABNORMAL /HPF

## 2018-10-15 PROCEDURE — 81001 URINALYSIS AUTO W/SCOPE: CPT | Performed by: PHYSICIAN ASSISTANT

## 2018-10-15 PROCEDURE — 99213 OFFICE O/P EST LOW 20 MIN: CPT | Performed by: PHYSICIAN ASSISTANT

## 2018-10-15 RX ORDER — NITROFURANTOIN 25; 75 MG/1; MG/1
100 CAPSULE ORAL 2 TIMES DAILY
Qty: 14 CAPSULE | Refills: 0 | Status: SHIPPED | OUTPATIENT
Start: 2018-10-15 | End: 2018-10-22

## 2018-10-15 NOTE — NURSING NOTE
"Initial /74  Pulse 85  Temp 98.8  F (37.1  C) (Tympanic)  Wt 209 lb 12.8 oz (95.2 kg)  SpO2 100%  BMI 40.3 kg/m2 Estimated body mass index is 40.3 kg/(m^2) as calculated from the following:    Height as of 8/13/18: 5' 0.5\" (1.537 m).    Weight as of this encounter: 209 lb 12.8 oz (95.2 kg). .    Corinne Carpio CMA([employer])    "

## 2018-10-15 NOTE — MR AVS SNAPSHOT
After Visit Summary   10/15/2018    Betina Tinsley    MRN: [id number]           Patient Information     Date Of Birth          1965        Visit Information        Provider Department      10/15/2018 6:00 PM Zayra Guevara PA-C [facility]        Today's Diagnoses     Urinary frequency    -  1       Follow-ups after your visit        Your next 10 appointments already scheduled     Oct 16, 2018  7:15 AM CDT   OTIS Extremity with Marcos Silverio, PT   [facility] ([facility] [place]  )    [address] [facility] [address] [facility] [address]   [phone]            Oct 19, 2018  7:15 AM CDT   OTIS Extremity with Marcos Silverio, PT   [facility] ([facility]  )    [address] [place] [id number] [phone]            Oct 25, 2018  7:15 AM CDT   OTIS Extremity with Marcos Silverio, PT   [facility] ([facility]  )    [address] [facility] [address] [phone]            Oct 30, 2018  7:15 AM CDT   OTIS Extremity with Marcos Silverio, PT   [facility] ([facility] [place]  )    [address] [facility] [facility] [address] [address] [phone]              Who to contact     Normal or non-critical lab and imaging results will be communicated to you by MyChart, letter or phone within 4 business days after the clinic has received the results. If you do not hear from us within 7 days, please contact the clinic through MyChart or phone. If you have a critical or abnormal lab result, we will notify you by phone as soon as possible.  Submit refill requests through [employer] or call your pharmacy and they will forward the refill request to us. Please allow 3 business days for your refill to be completed.          If you need to speak with a  for additional information , please call: [phone]           Additional Information About Your Visit        Care EveryWhere ID     This is  your Care EveryWhere ID. This could be used by other organizations to access your [facility] medical records  [id number]        Your Vitals Were     Pulse Temperature Pulse Oximetry BMI (Body Mass Index)          85 98.8  F (37.1  C) (Tympanic) 100% 40.3 kg/m2         Blood Pressure from Last 3 Encounters:   10/15/18 112/74   08/13/18 126/80   04/25/18 105/75    Weight from Last 3 Encounters:   10/15/18 209 lb 12.8 oz (95.2 kg)   08/13/18 209 lb 2 oz (94.9 kg)   04/25/18 207 lb 12.8 oz (94.3 kg)              We Performed the Following     UA reflex to Microscopic and Culture     Urine Microscopic          Today's Medication Changes          These changes are accurate as of 10/15/18  6:34 PM.  If you have any questions, ask your nurse or doctor.               Start taking these medicines.        Dose/Directions    nitroFURantoin (macrocrystal-monohydrate) 100 MG capsule   Commonly known as:  MACROBID   Used for:  Urinary frequency        Dose:  100 mg   Take 1 capsule (100 mg) by mouth 2 times daily   Quantity:  14 capsule   Refills:  0            Where to get your medicines      These medications were sent to [facility] [id number] [place] [address] [facility]  [address] [place] [address]     Phone:  [phone]     nitroFURantoin (macrocrystal-monohydrate) 100 MG capsule                Primary Care Provider Office Phone # Fax #    Madeleine Nancy Wells, APRN [phone]       [facility] [address] [place] [address]   [facility] [address]        Equal Access to Services     HORACIO TELLO AH: Hadii osiel kocho Sohomeroali, waaxda luqadaha, qaybta kaalmada adeegyalisa, siddhartha baires. So [facility] [phone].    ATENCIÓN: Si habla español, tiene a tsang disposición servicios gratuitos de asistencia lingüística. Llame al [phone].    We comply with applicable federal civil rights laws and [place] laws. We do not discriminate on the basis of race, color, national  origin, age, disability, sex, sexual orientation, or gender identity.            Thank you!     Thank you for choosing [facility]  for your care. Our goal is always to provide you with excellent care. Hearing back from our patients is one way we can continue to improve our services. Please take a few minutes to complete the written survey that you may receive in the mail after your visit with us. Thank you!             Your Updated Medication List - Protect others around you: Learn how to safely use, store and throw away your medicines at www.disposemymeds.org.          This list is accurate as of 10/15/18  6:34 PM.  Always use your most recent med list.                   Brand Name Dispense Instructions for use Diagnosis    AMBIEN PO      Take 5-10 mg by mouth nightly as needed.        amitriptyline 50 MG tablet    ELAVIL     1 TABLET AT BEDTIME        ATIVAN PO      Take 0.5 mg by mouth daily as needed for anxiety        BUSPAR PO      Take 30 mg by mouth 2 times daily        ciclopirox 0.77 % cream    LOPROX    90 g    Apply topically At Bedtime    Senile sebaceous gland hyperplasia, Dermatitis, seborrheic, Lentigo, SK (seborrheic keratosis)       cyclobenzaprine 10 MG tablet    FLEXERIL    30 tablet    Take 0.5-1 tablets (5-10 mg) by mouth 3 times daily as needed for muscle spasms    Achilles tendonitis, bilateral       desonide 0.05 % cream    DESOWEN    60 g    Apply sparingly to affected area three times daily as needed.    Senile sebaceous gland hyperplasia, Dermatitis, seborrheic, Lentigo, SK (seborrheic keratosis)       EXCEDRIN QUICKTABS PO      Take 1-2 tablets by mouth at onset of headache.        ketoconazole 2 % shampoo    NIZORAL    240 mL    Apply to the affected area and wash off after 5 minutes.    Senile sebaceous gland hyperplasia, Dermatitis, seborrheic, Lentigo, SK (seborrheic keratosis)       LAMICTAL PO      Take 150 mg by mouth daily        LEXAPRO PO      Take 20 mg by  mouth daily        MELATONIN PO      Take 5 mg by mouth At Bedtime.        metoprolol succinate 100 MG 24 hr tablet    TOPROL-XL    90 tablet    TAKE 1 TABLET (100 MG) BY MOUTH DAILY    Benign essential hypertension, Trigeminy       mometasone 50 MCG/ACT spray    NASONEX     Spray 2 sprays into both nostrils daily        nitroFURantoin (macrocrystal-monohydrate) 100 MG capsule    MACROBID    14 capsule    Take 1 capsule (100 mg) by mouth 2 times daily    Urinary frequency       OMEPRAZOLE PO      Take 20 mg by mouth daily        order for DME     1 Device    Trilok Ankle Brace    Posterior tibial tendonitis, right       simvastatin 10 MG tablet    ZOCOR    90 tablet    TAKE 1 TABLET (10 MG) BY MOUTH AT BEDTIME    Hyperlipidemia LDL goal <100       tolterodine 2 MG 24 hr capsule    DETROL LA    90 capsule    TAKE 1 CAPSULE (2 MG) BY MOUTH DAILY    Urinary frequency

## 2018-10-15 NOTE — PROGRESS NOTES
SUBJECTIVE:   Betina Tinsley is a 53 year old female who presents to clinic today for the following health issues:      URINARY TRACT SYMPTOMS      Duration: 7 days    Description  urgency and nocturia x 1    Intensity:  moderate    Accompanying signs and symptoms:  Fever/chills: no   Flank pain no   Nausea and vomiting: no   Vaginal symptoms: none  Abdominal/Pelvic Pain: no     History  History of frequent UTI's: YES  History of kidney stones: YES- passed a stone 8/1/18  Sexually Active: YES  Possibility of pregnancy: No    Precipitating or alleviating factors: None    Therapies tried and outcome: increase fluid intake   Outcome: some days its helpful      Recently had surgery on achilles tendon, comes in today in a boot.   She had a similar episode a few months back, where her urine was slightly abnormal (but not a full UTI).  Was started on antibiotics, bactrim didn't help, however then cipro used and symptoms improved.  She then noticed pain that started in low back and wrapped around front and subsequently passed a small stone.    She wonders if this is something similar to that previous episode.  She has absolutely no pain today, just urgency and it has been present for 1 week.  She has had lithotripsy to a previous stone in the past.         Problem list and histories reviewed & adjusted, as indicated.  Additional history: as documented    Patient Active Problem List   Diagnosis     Urinary incontinence     Benign essential hypertension     Mixed hyperlipidemia     Trigeminy     Urinary frequency     Dysthymia     Morbid obesity (H)     Achilles tendon tear     H/O renal calculi     Past Surgical History:   Procedure Laterality Date     LAPAROSCOPIC APPENDECTOMY  10/18/2012    Procedure: LAPAROSCOPIC APPENDECTOMY;  Laparoscopic Appendectomy;  Surgeon: Gutierrez Gauthier MD;  Location: [facility] OR       Social History   Substance Use Topics     Smoking status: Never Smoker     Smokeless tobacco: Never Used      Alcohol use No     Family History   Problem Relation Age of Onset     Dementia Mother      Myocardial Infarction Father      Cancer Father      skin         Current Outpatient Prescriptions   Medication Sig Dispense Refill     Acetaminophen-Caffeine (EXCEDRIN QUICKTABS PO) Take 1-2 tablets by mouth at onset of headache.         AMITRIPTYLINE HCL 50 MG PO TABS 1 TABLET AT BEDTIME       BusPIRone HCl (BUSPAR PO) Take 30 mg by mouth 2 times daily       ciclopirox (LOPROX) 0.77 % cream Apply topically At Bedtime 90 g 3     Escitalopram Oxalate (LEXAPRO PO) Take 20 mg by mouth daily       LamoTRIgine (LAMICTAL PO) Take 150 mg by mouth daily       LORazepam (ATIVAN PO) Take 0.5 mg by mouth daily as needed for anxiety       metoprolol succinate (TOPROL-XL) 100 MG 24 hr tablet TAKE 1 TABLET (100 MG) BY MOUTH DAILY 90 tablet 1     nitroFURantoin, macrocrystal-monohydrate, (MACROBID) 100 MG capsule Take 1 capsule (100 mg) by mouth 2 times daily 14 capsule 0     Nutritional Supplements (MELATONIN PO) Take 5 mg by mouth At Bedtime.         OMEPRAZOLE PO Take 20 mg by mouth daily       order for DME Trilok Ankle Brace 1 Device 0     simvastatin (ZOCOR) 10 MG tablet TAKE 1 TABLET (10 MG) BY MOUTH AT BEDTIME 90 tablet 1     tolterodine (DETROL LA) 2 MG 24 hr capsule TAKE 1 CAPSULE (2 MG) BY MOUTH DAILY 90 capsule 1     Zolpidem Tartrate (AMBIEN PO) Take 5-10 mg by mouth nightly as needed.         cyclobenzaprine (FLEXERIL) 10 MG tablet Take 0.5-1 tablets (5-10 mg) by mouth 3 times daily as needed for muscle spasms (Patient not taking: Reported on 10/15/2018) 30 tablet 1     desonide (DESOWEN) 0.05 % cream Apply sparingly to affected area three times daily as needed. (Patient not taking: Reported on 10/30/2017) 60 g 3     ketoconazole (NIZORAL) 2 % shampoo Apply to the affected area and wash off after 5 minutes. (Patient not taking: Reported on 10/30/2017) 240 mL 11     BP Readings from Last 3 Encounters:   10/15/18 112/74    08/13/18 126/80   04/25/18 105/75    Wt Readings from Last 3 Encounters:   10/15/18 209 lb 12.8 oz (95.2 kg)   08/13/18 209 lb 2 oz (94.9 kg)   04/25/18 207 lb 12.8 oz (94.3 kg)                    Reviewed and updated as needed this visit by clinical staff  Allergies  Meds       Reviewed and updated as needed this visit by Provider         ROS:  Constitutional, HEENT, cardiovascular, pulmonary, gi and gu systems are negative, except as otherwise noted.    OBJECTIVE:     /74  Pulse 85  Temp 98.8  F (37.1  C) (Tympanic)  Wt 209 lb 12.8 oz (95.2 kg)  SpO2 100%  BMI 40.3 kg/m2  Body mass index is 40.3 kg/(m^2).  GENERAL: healthy, alert and no distress  ABDOMEN: soft, nontender, no hepatosplenomegaly, no masses and bowel sounds normal    Diagnostic Test Results:  Results for orders placed or performed in visit on 10/15/18   UA reflex to Microscopic and Culture   Result Value Ref Range    Color Urine Yellow     Appearance Urine Clear     Glucose Urine Negative NEG^Negative mg/dL    Bilirubin Urine Negative NEG^Negative    Ketones Urine Negative NEG^Negative mg/dL    Specific Gravity Urine 1.020 1.003 - 1.035    Blood Urine Trace (A) NEG^Negative    pH Urine 7.0 5.0 - 7.0 pH    Protein Albumin Urine Negative NEG^Negative mg/dL    Urobilinogen Urine 0.2 0.2 - 1.0 EU/dL    Nitrite Urine Negative NEG^Negative    Leukocyte Esterase Urine Trace (A) NEG^Negative    Source Midstream Urine    Urine Microscopic   Result Value Ref Range    WBC Urine 0 - 5 OTO5^0 - 5 /HPF    RBC Urine 2-5 (A) OTO2^O - 2 /HPF    Squamous Epithelial /LPF Urine Few FEW^Few /LPF    Bacteria Urine Few (A) NEG^Negative /HPF       ASSESSMENT/PLAN:       1. Urinary frequency  May be due to a mild UTI vs non-obstructing stone.  Currently she has no pain, therefore will try treatment for UTI first.  If she does start to develop pain (especially if it starts to wrap around front) I suggest she start straining urine (hat provided today).   - UA  reflex to Microscopic and Culture  - Urine Microscopic    2. H/O renal calculi  Will monitor for pain.     3. Acute cystitis with hematuria  UTI uncomplicated without evidence of pyelonephritis    Urine analysis noted and in chart.   Treatment per orders -  patient to increase fluids/cranberry juice. Call or return to clinic prn if these symptoms worsen or fail to improve as anticipated.     - nitroFURantoin, macrocrystal-monohydrate, (MACROBID) 100 MG capsule; Take 1 capsule (100 mg) by mouth 2 times daily  Dispense: 14 capsule; Refill: 0    4. Rupture of right Achilles tendon, sequela  Will avoid cipro due to recent injury to achilles      FUTURE APPOINTMENTS:       - Follow-up visit if symptoms worsen or fail to improve as anticipated.     Zayra Guevara PA-C  [facility]

## 2018-10-16 ENCOUNTER — THERAPY VISIT (OUTPATIENT)
Dept: PHYSICAL THERAPY | Facility: CLINIC | Age: 53
End: 2018-10-16
Payer: COMMERCIAL

## 2018-10-16 DIAGNOSIS — S86.019A ACHILLES TENDON TEAR: ICD-10-CM

## 2018-10-16 PROCEDURE — 97112 NEUROMUSCULAR REEDUCATION: CPT | Mod: GP | Performed by: PHYSICAL THERAPIST

## 2018-10-16 PROCEDURE — 97110 THERAPEUTIC EXERCISES: CPT | Mod: GP | Performed by: PHYSICAL THERAPIST

## 2018-10-17 PROBLEM — Z87.442 H/O RENAL CALCULI: Status: ACTIVE | Noted: 2018-10-17

## 2018-10-25 ENCOUNTER — THERAPY VISIT (OUTPATIENT)
Dept: PHYSICAL THERAPY | Facility: CLINIC | Age: 53
End: 2018-10-25
Payer: COMMERCIAL

## 2018-10-25 DIAGNOSIS — S86.011S RUPTURE OF RIGHT ACHILLES TENDON, SEQUELA: ICD-10-CM

## 2018-10-25 PROCEDURE — 97112 NEUROMUSCULAR REEDUCATION: CPT | Mod: GP | Performed by: PHYSICAL THERAPIST

## 2018-10-25 PROCEDURE — 97110 THERAPEUTIC EXERCISES: CPT | Mod: GP | Performed by: PHYSICAL THERAPIST

## 2018-10-29 ENCOUNTER — TELEPHONE (OUTPATIENT)
Dept: FAMILY MEDICINE | Facility: CLINIC | Age: 53
End: 2018-10-29

## 2018-10-29 DIAGNOSIS — N39.46 MIXED STRESS AND URGE URINARY INCONTINENCE: Primary | ICD-10-CM

## 2018-10-30 ENCOUNTER — THERAPY VISIT (OUTPATIENT)
Dept: PHYSICAL THERAPY | Facility: CLINIC | Age: 53
End: 2018-10-30
Payer: COMMERCIAL

## 2018-10-30 DIAGNOSIS — S86.011S RUPTURE OF RIGHT ACHILLES TENDON, SEQUELA: ICD-10-CM

## 2018-10-30 PROCEDURE — 97112 NEUROMUSCULAR REEDUCATION: CPT | Mod: GP | Performed by: PHYSICAL THERAPIST

## 2018-10-30 PROCEDURE — 97110 THERAPEUTIC EXERCISES: CPT | Mod: GP | Performed by: PHYSICAL THERAPIST

## 2018-10-31 RX ORDER — OXYBUTYNIN CHLORIDE 5 MG/1
5 TABLET, EXTENDED RELEASE ORAL DAILY
Qty: 90 TABLET | Refills: 1 | Status: SHIPPED | OUTPATIENT
Start: 2018-10-31

## 2018-11-02 ENCOUNTER — THERAPY VISIT (OUTPATIENT)
Dept: PHYSICAL THERAPY | Facility: CLINIC | Age: 53
End: 2018-11-02
Payer: COMMERCIAL

## 2018-11-02 DIAGNOSIS — S86.011S RUPTURE OF RIGHT ACHILLES TENDON, SEQUELA: ICD-10-CM

## 2018-11-02 PROCEDURE — 97112 NEUROMUSCULAR REEDUCATION: CPT | Mod: GP | Performed by: PHYSICAL THERAPIST

## 2018-11-02 PROCEDURE — 97110 THERAPEUTIC EXERCISES: CPT | Mod: GP | Performed by: PHYSICAL THERAPIST

## 2018-11-06 ENCOUNTER — THERAPY VISIT (OUTPATIENT)
Dept: PHYSICAL THERAPY | Facility: CLINIC | Age: 53
End: 2018-11-06
Payer: COMMERCIAL

## 2018-11-06 DIAGNOSIS — S86.011S RUPTURE OF RIGHT ACHILLES TENDON, SEQUELA: ICD-10-CM

## 2018-11-06 PROCEDURE — 97110 THERAPEUTIC EXERCISES: CPT | Mod: GP | Performed by: PHYSICAL THERAPIST

## 2018-11-06 PROCEDURE — 97112 NEUROMUSCULAR REEDUCATION: CPT | Mod: GP | Performed by: PHYSICAL THERAPIST

## 2018-11-20 ENCOUNTER — THERAPY VISIT (OUTPATIENT)
Dept: PHYSICAL THERAPY | Facility: CLINIC | Age: 53
End: 2018-11-20
Payer: COMMERCIAL

## 2018-11-20 DIAGNOSIS — S86.011S RUPTURE OF RIGHT ACHILLES TENDON, SEQUELA: ICD-10-CM

## 2018-11-20 DIAGNOSIS — Z98.890 S/P ACHILLES TENDON REPAIR: Primary | ICD-10-CM

## 2018-11-20 PROCEDURE — 97110 THERAPEUTIC EXERCISES: CPT | Mod: GP | Performed by: PHYSICAL THERAPIST

## 2018-11-20 PROCEDURE — 97112 NEUROMUSCULAR REEDUCATION: CPT | Mod: GP | Performed by: PHYSICAL THERAPIST

## 2018-11-28 ENCOUNTER — THERAPY VISIT (OUTPATIENT)
Dept: PHYSICAL THERAPY | Facility: CLINIC | Age: 53
End: 2018-11-28
Payer: COMMERCIAL

## 2018-11-28 DIAGNOSIS — S86.011S RUPTURE OF RIGHT ACHILLES TENDON, SEQUELA: ICD-10-CM

## 2018-11-28 PROCEDURE — 97112 NEUROMUSCULAR REEDUCATION: CPT | Mod: GP | Performed by: PHYSICAL THERAPIST

## 2018-11-28 PROCEDURE — 97110 THERAPEUTIC EXERCISES: CPT | Mod: GP | Performed by: PHYSICAL THERAPIST

## 2018-11-29 DIAGNOSIS — J30.2 SEASONAL ALLERGIC RHINITIS: ICD-10-CM

## 2018-11-29 RX ORDER — OXYMETAZOLINE HYDROCHLORIDE 0.05 G/100ML
2-3 SPRAY NASAL 2 TIMES DAILY PRN
Qty: 1 BOTTLE | Refills: 0 | Status: CANCELLED | OUTPATIENT
Start: 2018-11-29

## 2018-11-29 RX ORDER — MOMETASONE FUROATE MONOHYDRATE 50 UG/1
2 SPRAY, METERED NASAL DAILY
Qty: 17 G | Refills: 2 | Status: CANCELLED | OUTPATIENT
Start: 2018-11-29

## 2018-11-29 NOTE — TELEPHONE ENCOUNTER
Called and spoke with patient  Has not had any allergy sx for several years, then this past fall /winter noted nasal drainage , NO other sx  Discussed requested meds and usage  Also reviewed use of OTC allergy medication  Maybe better choice for the one sx she has  She will  and try for 30 days , call if sx change worsen or new ones present   DOMINI[facility]  RN/Eliel Mcnulty

## 2018-12-04 ENCOUNTER — THERAPY VISIT (OUTPATIENT)
Dept: PHYSICAL THERAPY | Facility: CLINIC | Age: 53
End: 2018-12-04
Payer: COMMERCIAL

## 2018-12-04 DIAGNOSIS — S86.011S RUPTURE OF RIGHT ACHILLES TENDON, SEQUELA: ICD-10-CM

## 2018-12-04 PROCEDURE — 97112 NEUROMUSCULAR REEDUCATION: CPT | Mod: GP | Performed by: PHYSICAL THERAPIST

## 2018-12-04 PROCEDURE — 97110 THERAPEUTIC EXERCISES: CPT | Mod: GP | Performed by: PHYSICAL THERAPIST

## 2018-12-27 ENCOUNTER — THERAPY VISIT (OUTPATIENT)
Dept: PHYSICAL THERAPY | Facility: CLINIC | Age: 53
End: 2018-12-27
Payer: COMMERCIAL

## 2018-12-27 DIAGNOSIS — S86.011S RUPTURE OF RIGHT ACHILLES TENDON, SEQUELA: ICD-10-CM

## 2018-12-27 PROCEDURE — 97110 THERAPEUTIC EXERCISES: CPT | Mod: GP | Performed by: PHYSICAL THERAPIST

## 2018-12-27 PROCEDURE — 97112 NEUROMUSCULAR REEDUCATION: CPT | Mod: GP | Performed by: PHYSICAL THERAPIST

## 2019-01-02 PROBLEM — S86.019A ACHILLES TENDON TEAR: Status: RESOLVED | Noted: 2018-10-02 | Resolved: 2019-01-02

## 2019-01-02 NOTE — PROGRESS NOTES
Subjective:  HPI                    Objective:  System    Physical Exam    General     ROS    Assessment/Plan:    DISCHARGE REPORT    Progress reporting period is from 10/2/18 to 12/27/18.       SUBJECTIVE  Subjective: Pt overall feeling well with her R heel. Gets some lateral R foot discomfort, unsure why. Stairs is getting better, not painful anymore. Only feels weakness in hips still.    Current Pain level: 0/10.      Initial Pain level: 3/10.   Changes in function:  Yes (See Goal flowsheet attached for changes in current functional level)  Adverse reaction to treatment or activity: None    OBJECTIVE  Objective: 48 cm swelling R ankle, 13 degrees DF RLE     ASSESSMENT/PLAN  STG/LTGs have been met or progress has been made towards goals:  Yes (See Goal flow sheet completed today.)  Assessment of Progress: The patient's condition is improving.  The patient has met all of their long term goals.  Self Management Plans:  Patient is independent in a home treatment program.  Patient is independent in self management of symptoms.  I have re-evaluated this patient and find that the nature, scope, duration and intensity of the therapy is appropriate for the medical condition of the patient.  Betina continues to require the following intervention to meet STG and LTG's:  PT intervention is no longer required to meet STG/LTG.    Recommendations:  This patient is ready to be discharged from therapy and continue their home treatment program.    Please refer to the daily flowsheet for treatment today, total treatment time and time spent performing 1:1 timed codes.

## 2019-02-26 ENCOUNTER — THERAPY VISIT (OUTPATIENT)
Dept: PHYSICAL THERAPY | Facility: CLINIC | Age: 54
End: 2019-02-26
Payer: COMMERCIAL

## 2019-02-26 DIAGNOSIS — M25.551 HIP PAIN, RIGHT: ICD-10-CM

## 2019-02-26 DIAGNOSIS — M54.50 CHRONIC RIGHT-SIDED LOW BACK PAIN WITHOUT SCIATICA: ICD-10-CM

## 2019-02-26 DIAGNOSIS — G89.29 CHRONIC RIGHT-SIDED LOW BACK PAIN WITHOUT SCIATICA: ICD-10-CM

## 2019-02-26 DIAGNOSIS — M25.552 HIP PAIN, LEFT: ICD-10-CM

## 2019-02-26 PROCEDURE — 97110 THERAPEUTIC EXERCISES: CPT | Mod: GP | Performed by: PHYSICAL THERAPIST

## 2019-02-26 PROCEDURE — 97162 PT EVAL MOD COMPLEX 30 MIN: CPT | Mod: GP | Performed by: PHYSICAL THERAPIST

## 2019-02-26 ASSESSMENT — ACTIVITIES OF DAILY LIVING (ADL)
WALKING_INITIALLY: EXTREME DIFFICULTY
DEEP_SQUATTING: UNABLE TO DO
GOING_UP_1_FLIGHT_OF_STAIRS: EXTREME DIFFICULTY
GETTING_INTO_AND_OUT_OF_AN_AVERAGE_CAR: MODERATE DIFFICULTY
WALKING_APPROXIMATELY_10_MINUTES: NO DIFFICULTY AT ALL
HOS_ADL_COUNT: 14
WALKING_DOWN_STEEP_HILLS: UNABLE TO DO
HOS_ADL_HIGHEST_POTENTIAL_SCORE: 56
HOW_WOULD_YOU_RATE_YOUR_CURRENT_LEVEL_OF_FUNCTION_DURING_YOUR_USUAL_ACTIVITIES_OF_DAILY_LIVING_FROM_0_TO_100_WITH_100_BEING_YOUR_LEVEL_OF_FUNCTION_PRIOR_TO_YOUR_HIP_PROBLEM_AND_0_BEING_THE_INABILITY_TO_PERFORM_ANY_OF_YOUR_USUAL_DAILY_ACTIVITIES?: 50
WALKING_UP_STEEP_HILLS: UNABLE TO DO
WALKING_15_MINUTES_OR_GREATER: MODERATE DIFFICULTY
HEAVY_WORK: EXTREME DIFFICULTY
HOS_ADL_ITEM_SCORE_TOTAL: 16
STANDING_FOR_15_MINUTES: EXTREME DIFFICULTY
ROLLING_OVER_IN_BED: EXTREME DIFFICULTY
STEPPING_UP_AND_DOWN_CURBS: EXTREME DIFFICULTY
PUTTING_ON_SOCKS_AND_SHOES: MODERATE DIFFICULTY
GOING_DOWN_1_FLIGHT_OF_STAIRS: EXTREME DIFFICULTY
HOS_ADL_SCORE(%): 28.57
GETTING_INTO_AND_OUT_OF_A_BATHTUB: EXTREME DIFFICULTY

## 2019-02-26 NOTE — PROGRESS NOTES
"[facility] Initial Evaluation  Subjective:  The history is provided by the patient. No  was used.   Betina Tinsley is a 54 year old female with a bilateral hips condition.  Condition occurred with:  Insidious onset.  Condition occurred: for unknown reasons.  This is a chronic condition  Pt states has been dealing with hip pain for a number of years, even before Achilles surgery.  Had R Achilles repair 2018 including \"bone spurs.\"  Hips have still been problematic, especially over the past could weeks without specific incident.  Referred to PT 01/31/2019.  Pt states seems to be somewhat worse since then and is seeing chiropractor later this week.    Patient reports pain:  Lateral.  Radiates to: R low back.  Pain is described as sharp and is intermittent and reported as 7/10.   Worse during: no particular pattern re: time of day.  Exacerbated by: stairs, sit to stand, initially upon standing. Relieved by: walking (either after getting going or typically on treadmill, but not necessarily better on treadmill this week)  Since onset symptoms are gradually worsening.  Special testing: N/A.      General health as reported by patient is good.  Pertinent medical history includes:  Depression, high blood pressure, incontinence, menopausal and overweight.  Medical allergies: yes (see chart).  Surgical history: lipoma, tubal ligation, wisdom teeth, B cataract, R Achilles.  Current medications:  Anti-depressants, high blood pressure medication and sleep medication.  Current occupation is .  Patient is working in normal job without restrictions.  Primary job tasks include:  Prolonged sitting.    Barriers include:  None as reported by the patient.    Red flags:  None as reported by the patient.    Pt reports she has noted not feeling as steady on her feet lately.  Has fallen a few times over the past few months and not really noticing a pattern re: setting, time of day, etc.  " Feels like she sometimes drifts one direction or the other.  However, finds this to be rather rare and states that her hips are of greater concern at this point.                    Objective:  System         Lumbar/SI Evaluation    Lumbar Myotomes:    T12-L3 (Hip Flex):  Left: 4    Right: 4  L2-4 (Quads):  Left:  4    Right:  4  L4 (Ankle DF):  Left:  4    Right:  4  L5 (Great Toe Ext): Left: 4    Right: 4   S1 (Toe Raise):  Left: 4    Right: 4        Neural Tension/Mobility:      Left side:SLR or SLR w/DF  negative.     Right side:   SLR w/DF or SLR  negative.   Lumbar Palpation:      Tenderness present at Right: PSIS    Lumbar Provocation:      Left negative with:  PROM hip    Right negative with:  PROM hip    SI joint/Sacrum:    Negative Sergio thigh thrustANA MARIA Lumbar Evaluation    Posture:  Sitting: fair  Standing: fair    Lateral Shift: no  Correction of Posture: no effect    Movement Loss:  Flexion (Flex): min and pain  Extension (EXT): major and pain  Side [place] R (SG R): min and pain  Side Glide L (SG L): min  Test Movements:  FIS: During: increases  After: no worse  Pretest Movements: R low back  Repeat FIS: During: increases  After: no worse    EIS: During: increases  After: no worse    Repeat EIS: During: increases  After: no worse    COLLEEN: During: increases  After: no worse    Repeat COLLEEN: During: increases  After: no worse    EIL: During: decreases  After: no better    Repeat EIL: During: decreases  After: better          Conclusion: derangement  Principle of Treatment:      Extension: REIL                                           ROS    Assessment/Plan:    Patient is a 54 year old female with lumbar complaints.    Patient has the following significant findings with corresponding treatment plan.                Diagnosis 1:  LBP with comorbidities  Pain -  hot/cold therapy  Decreased ROM/flexibility - manual therapy and therapeutic  exercise  Decreased strength - therapeutic exercise and therapeutic activities  Decreased function - therapeutic activities  Impaired posture - neuro re-education    Therapy Evaluation Codes:   1) History comprised of:   Personal factors that impact the plan of care:      Time since onset of symptoms.    Comorbidity factors that impact the plan of care are:      Depression and Overweight.     Medications impacting care: Anti-depressant.  2) Examination of Body Systems comprised of:   Body structures and functions that impact the plan of care:      Ankle, Hip and Lumbar spine.   Activity limitations that impact the plan of care are:      Sitting and Stairs.  3) Clinical presentation characteristics are:   Evolving/Changing.  4) Decision-Making    Moderate complexity using standardized patient assessment instrument and/or measureable assessment of functional outcome.  Cumulative Therapy Evaluation is: Moderate complexity.    Previous and current functional limitations:  (See Goal Flow Sheet for this information)    Short term and Long term goals: (See Goal Flow Sheet for this information)     Communication ability:  Patient appears to be able to clearly communicate and understand verbal and written communication and follow directions correctly.  Treatment Explanation - The following has been discussed with the patient:   RX ordered/plan of care  Anticipated outcomes  Possible risks and side effects  This patient would benefit from PT intervention to resume normal activities.   Rehab potential is fair to good.  Anticipate further evaluation of contribution re: hips, h/o Achilles surgery, balance.    Frequency:  1 X week, once daily  Duration:  for 6 weeks  Discharge Plan:  Achieve all LTG.  Independent in home treatment program.  Reach maximal therapeutic benefit.    Please refer to the daily flowsheet for treatment today, total treatment time and time spent performing 1:1 timed codes.

## 2019-02-26 NOTE — LETTER
"[facility] ATHLETIC [facility] [place] PT  [address]  [address]  [place] [address]  [phone]    2019    Re: Betina Tinsley   :   1965  MRN:  [id number]   REFERRING PHYSICIAN:   Gwendolyn Nielsen    [facility] ATHLETIC [facility] PT    Date of Initial Evaluation: 19  Visits:  Rxs Used: 1  Reason for Referral:     Chronic right-sided low back pain without sciatica  Hip pain, left  Hip pain, right    EVALUATION SUMMARY    [facility] Athletic [facility] Initial Evaluation  Subjective:  The history is provided by the patient. No  was used.   Betina Tinsley is a 54 year old female with a bilateral hips condition.  Condition occurred with:  Insidious onset.  Condition occurred: for unknown reasons.  This is a chronic condition  Pt states has been dealing with hip pain for a number of years, even before Achilles surgery.  Had R Achilles repair 2018 including \"bone spurs.\"  Hips have still been problematic, especially over the past could weeks without specific incident.  Referred to PT 2019.  Pt states seems to be somewhat worse since then and is seeing chiropractor later this week.  Patient reports pain:  Lateral.  Radiates to: R low back.  Pain is described as sharp and is intermittent and reported as 7/10.   Worse during: no particular pattern re: time of day.  Exacerbated by: stairs, sit to stand, initially upon standing. Relieved by: walking (either after getting going or typically on treadmill, but not necessarily better on treadmill this week)  Since onset symptoms are gradually worsening.  Special testing: N/A.      General health as reported by patient is good.  Pertinent medical history includes:  Depression, high blood pressure, incontinence, menopausal and overweight.  Medical allergies: yes (see chart).  Surgical history: lipoma, tubal ligation, wisdom teeth, B cataract, R Achilles.  Current medications:  Anti-depressants, " high blood pressure medication and sleep medication.  Current occupation is .  Patient is working in normal job without restrictions.  Primary job tasks include:  Prolonged sitting.    Barriers include:  None as reported by the patient.  Red flags:  None as reported by the patient.    Pt reports she has noted not feeling as steady on her feet lately.  Has fallen a few times over the past few months and not really noticing a pattern re: setting, time of day, etc.  Feels like she sometimes drifts one direction or the other.  However, finds this to be rather rare and states that her hips are of greater concern at this point.                    Betina Tinsley   :   1965        Objective:  System         Lumbar/SI Evaluation  Lumbar Myotomes:    T12-L3 (Hip Flex):  Left: 4    Right: 4  L2-4 (Quads):  Left:  4    Right:  4  L4 (Ankle DF):  Left:  4    Right:  4  L5 (Great Toe Ext): Left: 4    Right: 4   S1 (Toe Raise):  Left: 4    Right: 4    Neural Tension/Mobility:      Left side:SLR or SLR w/DF  negative.     Right side:   SLR w/DF or SLR  negative.   Lumbar Palpation:    Tenderness present at Right: PSIS    Lumbar Provocation:    Left negative with:  PROM hip  Right negative with:  PROM hip    SI joint/Sacrum:    Negative Sergio thigh thrustANA MARIA Lumbar Evaluation    Posture:  Sitting: fair  Standing: fair    Lateral Shift: no  Correction of Posture: no effect    Movement Loss:  Flexion (Flex): min and pain  Extension (EXT): major and pain  Side [place] R (SG R): min and pain  Side Glide L (SG L): min  Test Movements:  FIS: During: increases  After: no worse  Pretest Movements: R low back  Repeat FIS: During: increases  After: no worse    EIS: During: increases  After: no worse    Repeat EIS: During: increases  After: no worse    COLLEEN: During: increases  After: no worse    Repeat COLLEEN: During: increases  After: no worse    EIL: During: decreases  After: no better    Repeat EIL:  During: decreases  After: better    Betina Tinsley   :   1965        Conclusion: derangement  Principle of Treatment:    Extension: REIL    Assessment/Plan:    Patient is a 54 year old female with lumbar complaints.    Patient has the following significant findings with corresponding treatment plan.                Diagnosis 1:  LBP with comorbidities  Pain -  hot/cold therapy  Decreased ROM/flexibility - manual therapy and therapeutic exercise  Decreased strength - therapeutic exercise and therapeutic activities  Decreased function - therapeutic activities  Impaired posture - neuro re-education    Previous and current functional limitations:  (See Goal Flow Sheet for this information)    Short term and Long term goals: (See Goal Flow Sheet for this information)     Communication ability:  Patient appears to be able to clearly communicate and understand verbal and written communication and follow directions correctly.  Treatment Explanation - The following has been discussed with the patient:   RX ordered/plan of care  Anticipated outcomes  Possible risks and side effects  This patient would benefit from PT intervention to resume normal activities.   Rehab potential is fair to good.  Anticipate further evaluation of contribution re: hips, h/o Achilles surgery, balance.    Frequency:  1 X week, once daily  Duration:  for 6 weeks  Discharge Plan:  Achieve all LTG.  Independent in home treatment program.  Reach maximal therapeutic benefit.          Thank you for your referral.    INQUIRIES  Therapist: Tahir Hoffman, PT, ATC, [facility]  [facility] [place]  [address]  [address]  [place] [address]  Phone: [phone]  Fax: [phone]

## 2019-02-28 ENCOUNTER — THERAPY VISIT (OUTPATIENT)
Dept: CHIROPRACTIC MEDICINE | Facility: CLINIC | Age: 54
End: 2019-02-28
Payer: COMMERCIAL

## 2019-02-28 DIAGNOSIS — M99.05 SEGMENTAL DYSFUNCTION OF PELVIC REGION: Primary | ICD-10-CM

## 2019-02-28 DIAGNOSIS — M99.03 SEGMENTAL DYSFUNCTION OF LUMBAR REGION: ICD-10-CM

## 2019-02-28 DIAGNOSIS — M99.02 THORACIC SEGMENT DYSFUNCTION: ICD-10-CM

## 2019-02-28 DIAGNOSIS — M62.838 SPASM OF MUSCLE: ICD-10-CM

## 2019-02-28 DIAGNOSIS — M54.50 LUMBAGO: ICD-10-CM

## 2019-02-28 PROCEDURE — 98941 CHIROPRACT MANJ 3-4 REGIONS: CPT | Mod: AT | Performed by: CHIROPRACTOR

## 2019-02-28 PROCEDURE — 99203 OFFICE O/P NEW LOW 30 MIN: CPT | Mod: 25 | Performed by: CHIROPRACTOR

## 2019-02-28 NOTE — PROGRESS NOTES
Initial Chiropractic Clinic Visit    PCP: Madeleine Wells    Betina Tinsley is a 54 year old female who is seen  as a self referral presenting with low back pain . Patient reports that the onset was after march of 2017.  This is when she had foot issues and she started walking awkwardly.  The altered gait caused her to start having some low back pain.  She eventually had foot surgery and has completed the rehab of which a couple of months ago.  Since that time her low back pain has been pretty bad and is it interfering with her ability to walk and sit comfortably. The pain is located in her lower left lumbar and more on the right side to about her R hip.  There are no radiating symptoms and it does interfere with her sleep    Injury: none    Location of Pain: right, lower lumbar at the following level(s) L4 , L5  and PSIS Right   Duration of Pain: 2 year(s)  Rating of Pain at worst: 9/10  Rating of Pain Currently: 9/10  Symptoms are better with: Tylenol and Rest  Symptoms are worse with: walking and then sitting  Additional Features:      Health History  as reported by the patient:    How does the patient rate their own health:   Good    Current or past medical history:   Depression, High blood pressure, Incontinence, Menopause and Overweight    Medical allergies  None    Past Traumas/Surgeries  Other:  Tubal ligation, lipoma, wisdom teeth and foot    Family History  The family history includes Cancer in her father; Dementia in her mother; Myocardial Infarction in her father.    Medications:  Anti-depressants, High blood pressure and Sleep    Occupation:      Primary job tasks:   Computer work and Prolonged sitting    Barriers as home/work:   none    Additional health Issues:                 Betina was asked to complete the Oswestry Low Back Disability Index and Eze Start Back screening tool, today in the office.  Disability score: 58%. Keel Start Total Score:6 Sub Score: 3     Review of  "Systems  Musculoskeletal: as above  Remainder of review of systems is negative including constitutional, CV, pulmonary, GI, Skin and Neurologic except as noted in HPI or medical history.    No past medical history on file.  Past Surgical History:   Procedure Laterality Date     LAPAROSCOPIC APPENDECTOMY  10/18/2012    Procedure: LAPAROSCOPIC APPENDECTOMY;  Laparoscopic Appendectomy;  Surgeon: Gutierrez Gauthier MD;  Location: [facility] OR     Objective  There were no vitals taken for this visit.      GENERAL APPEARANCE: healthy, alert and no distress   GAIT: NORMAL  SKIN: no suspicious lesions or rashes  NEURO: Normal strength and tone, mentation intact and speech normal  PSYCH:  mentation appears normal and affect normal/bright    Low back exam:    Inspection:  \"     no visible deformity in the low back       normal skin\",    ROM:       full flexion       limited extension due to pain    Tender:       paraspinal muscles    Non Tender:       remainder of lumbar spine    Strength:       hip flexion 5/5 Normal       knee extension 5/5 Normal       ankle dorsiflexion 5/5 Normal       ankle plantarflexion 5/5 Normal       dorsiflexion of the great toe 5/5 Normal    Reflexes:       patellar (L3, L4) 2 bilaterally    Sensation:      grossly intact throughout lower extremities    Special tests:  SLR - Right negative and Left negative, Fabere - Right negative and Left negative, Yeoman's - Right negative and Left negative, Citlaly - Right negative and Left negative and Ely's - Right negative and Left negative    Segmental spinal dysfunction/restrictions found at:  T10, T11 , T12 , L4 , L5  and PSIS Right     The following soft tissue hypotonicities were observed:Piriformis: right, referred pain: no    Trigger points were found in:Piriformis    Muscle spasm found in:Piriformis      Radiology:      Assessment:    1. Segmental dysfunction of pelvic region    2. Lumbago    3. Segmental dysfunction of lumbar region    4. Spasm of " muscle    5. Thoracic segment dysfunction        RX ordered/plan of care  Anticipated outcomes  Possible risks and side effects    After discussing the risk and benefits of care, patient consented to treatment    Prognosis: Good      Patient's condition:  Patient had restrictions pre-manipulation    Treatment effectiveness:  Post manipulation there is better intersegmental movement and Patient claims to feel looser post manipulation      Plan:    Procedures:  Evaluation and Management:  76137 Moderate level exam 30 min    CMT:  69703 Chiropractic manipulative treatment 3-4 regions performed   Thoracic: Activator, T10, T11, T12, Prone  Lumbar: Activator, L4, L5, Prone  Pelvis: Drop Table, PSIS Right , Prone    Modalities:  08590: Heat:   For 5 min to Piriformis  18951: MSTM:  To Piriformis  for 5 min    Therapeutic procedures:  None    Response to Treatment  Reduction in symptoms as reported by patient      Treatment plan and goals:  Goals:  SLEEPING: the patient specific goal is to attain his pre-injury status of 6 hours comfortably    Frequency of care  Duration of care is estimated to be 8 weeks, from the initial treatment.  It is estimated that the patient will need a total of 8 visits to resolve this episode.  For the initial therapeutic trial of care, the frequency is recommended at 1 X week, once daily.  A reevaluation would be clinically appropriate in 8 visits, to determine progress and further course of care.    In-Office Treatment  Evaluation  Spinal Chiropractic Manipulative Therapy:    Modalities: Heat and MSTM        Recommendations:    Instructions:  ice 20 minutes every other hour as needed    Follow-up:    Return to care in one week.       Discussed the assessment with the patient.      Disclaimer: This note consists of symbols derived from keyboarding, dictation and/or voice recognition software. As a result, there may be errors in the script that have gone undetected. Please consider this when  interpreting information found in this chart.

## 2019-03-04 ENCOUNTER — THERAPY VISIT (OUTPATIENT)
Dept: CHIROPRACTIC MEDICINE | Facility: CLINIC | Age: 54
End: 2019-03-04
Payer: COMMERCIAL

## 2019-03-04 DIAGNOSIS — M99.05 SEGMENTAL DYSFUNCTION OF PELVIC REGION: Primary | ICD-10-CM

## 2019-03-04 DIAGNOSIS — M54.50 LUMBAGO: ICD-10-CM

## 2019-03-04 DIAGNOSIS — M99.02 THORACIC SEGMENT DYSFUNCTION: ICD-10-CM

## 2019-03-04 DIAGNOSIS — M62.838 SPASM OF MUSCLE: ICD-10-CM

## 2019-03-04 DIAGNOSIS — M99.03 SEGMENTAL DYSFUNCTION OF LUMBAR REGION: ICD-10-CM

## 2019-03-04 PROCEDURE — 98941 CHIROPRACT MANJ 3-4 REGIONS: CPT | Mod: AT | Performed by: CHIROPRACTOR

## 2019-03-04 NOTE — PROGRESS NOTES
Visit #:  2    Subjective:  Betina Tinsley is a 54 year old female who is seen in f/u up for:        Segmental dysfunction of pelvic region  Lumbago  Segmental dysfunction of lumbar region  Spasm of muscle  Thoracic segment dysfunction.     Since last visit on 2/28/2019,  Betina Tinsley reports:    Area of chief complaint:  Thoracic and Lumbar :  Symptoms are graded at 4/10. The quality is described as stiff, achey, dull.  Motion has increased, but is still not normal. Patient feels that they are improved due to a reduction in symptoms. Betina says that she is feeling better.  She is still having some symptoms but overall is feeling improved.  She was able to vacuum this past weekend.       Objective:  The following was observed:    P: palpatory tenderness Piriformis R>>L  A: static palpation demonstrates intersegmental asymmetry , thoracic, lumbar, pelvis  R: motion palpation notes restricted motion, T10, T11 , T12 , L4 , L5  and PSIS Right   T: hypertonicity at: Traps R>>L    Segmental spinal dysfunction/restrictions found at:  T10, T11 , T12 , L4 , L5  and PSIS Right       Assessment:    Diagnoses:      1. Segmental dysfunction of pelvic region    2. Lumbago    3. Segmental dysfunction of lumbar region    4. Spasm of muscle    5. Thoracic segment dysfunction        Patient's condition:  Patient had restrictions pre-manipulation    Treatment effectiveness:  Post manipulation there is better intersegmental movement and Patient claims to feel looser post manipulation      Procedures:  CMT:  05689 Chiropractic manipulative treatment 3-4 regions performed   Thoracic: Activator, T10, T11, T12, Prone  Lumbar: Activator, L4, L5, Prone  Pelvis: Drop Table, PSIS Right , Prone    Modalities:  None performed this visit    Therapeutic procedures:  None    Response to Treatment  Reduction in symptoms as reported by patient    Prognosis: Good    Progress towards Goals: Patient is making progress towards the  goal.     Recommendations:    Instructions:  ice 20 minutes every other hour as needed    Follow-up:    Return to care in one week.

## 2019-03-05 ENCOUNTER — THERAPY VISIT (OUTPATIENT)
Dept: PHYSICAL THERAPY | Facility: CLINIC | Age: 54
End: 2019-03-05
Payer: COMMERCIAL

## 2019-03-05 DIAGNOSIS — M25.552 HIP PAIN, LEFT: ICD-10-CM

## 2019-03-05 DIAGNOSIS — M25.551 HIP PAIN, RIGHT: ICD-10-CM

## 2019-03-05 DIAGNOSIS — G89.29 CHRONIC RIGHT-SIDED LOW BACK PAIN WITHOUT SCIATICA: ICD-10-CM

## 2019-03-05 DIAGNOSIS — M54.50 CHRONIC RIGHT-SIDED LOW BACK PAIN WITHOUT SCIATICA: ICD-10-CM

## 2019-03-05 PROCEDURE — 97112 NEUROMUSCULAR REEDUCATION: CPT | Mod: GP | Performed by: PHYSICAL THERAPIST

## 2019-03-05 PROCEDURE — 97110 THERAPEUTIC EXERCISES: CPT | Mod: GP | Performed by: PHYSICAL THERAPIST

## 2019-03-11 ENCOUNTER — THERAPY VISIT (OUTPATIENT)
Dept: CHIROPRACTIC MEDICINE | Facility: CLINIC | Age: 54
End: 2019-03-11
Payer: COMMERCIAL

## 2019-03-11 DIAGNOSIS — M99.02 THORACIC SEGMENT DYSFUNCTION: ICD-10-CM

## 2019-03-11 DIAGNOSIS — M54.50 LOW BACK PAIN: ICD-10-CM

## 2019-03-11 DIAGNOSIS — I10 BENIGN ESSENTIAL HYPERTENSION: ICD-10-CM

## 2019-03-11 DIAGNOSIS — M99.05 SEGMENTAL DYSFUNCTION OF PELVIC REGION: Primary | ICD-10-CM

## 2019-03-11 DIAGNOSIS — M99.03 SEGMENTAL DYSFUNCTION OF LUMBAR REGION: ICD-10-CM

## 2019-03-11 DIAGNOSIS — M62.838 SPASM OF MUSCLE: ICD-10-CM

## 2019-03-11 DIAGNOSIS — R00.8 TRIGEMINY: ICD-10-CM

## 2019-03-11 PROCEDURE — 98941 CHIROPRACT MANJ 3-4 REGIONS: CPT | Mod: AT | Performed by: CHIROPRACTOR

## 2019-03-11 NOTE — TELEPHONE ENCOUNTER
"Requested Prescriptions   Pending Prescriptions Disp Refills     metoprolol succinate ER (TOPROL-XL) 100 MG 24 hr tablet [Pharmacy Med Name: METOPROLOL SUCC  MG TAB]  Last Written Prescription Date:  09/18/18  Last Fill Quantity: 90,  # refills: 1   Last office visit: 10/15/2018 with prescribing provider:  Madeleine Wells   Future Office Visit:     90 tablet 1     Sig: TAKE 1 TABLET (100 MG) BY MOUTH DAILY    Beta-Blockers Protocol Passed - 3/11/2019  1:24 AM       Passed - Blood pressure under 140/90 in past 12 months    BP Readings from Last 3 Encounters:   10/15/18 112/74   08/13/18 126/80   04/25/18 105/75          Passed - Patient is age 6 or older       Passed - Recent (12 mo) or future (30 days) visit within the authorizing provider's specialty    Patient had office visit in the last 12 months or has a visit in the next 30 days with authorizing provider or within the authorizing provider's specialty.  See \"Patient Info\" tab in inbasket, or \"Choose Columns\" in Meds & Orders section of the refill encounter.         Passed - Medication is active on med list          "

## 2019-03-11 NOTE — PROGRESS NOTES
Visit #:  3    Subjective:  Betina Tinsley is a 54 year old female who is seen in f/u up for:        Segmental dysfunction of pelvic region  Low back pain  Segmental dysfunction of lumbar region  Spasm of muscle  Thoracic segment dysfunction.     Since last visit on 3/4/2019,  Betina Tinsley reports:    Area of chief complaint:  Thoracic and Lumbar :  Symptoms are graded at 6/10. The quality is described as stiff, achey, dull.  Motion has increased, but is still not normal. Patient feels that they are improved due to a reduction in symptoms. Betina says that she was feeling better, then she had a very stressful weekend and started having some low back pain and stiffness.  Things got exacerbated at work when she had to walk more than you.       Objective:  The following was observed:    P: palpatory tenderness Piriformis R>>L  A: static palpation demonstrates intersegmental asymmetry , thoracic, lumbar, pelvis  R: motion palpation notes restricted motion, T10, T11 , T12 , L4 , L5  and PSIS Right   T: hypertonicity at: Traps R>>L    Segmental spinal dysfunction/restrictions found at:  T10, T11 , T12 , L4 , L5  and PSIS Right       Assessment:    Diagnoses:      1. Segmental dysfunction of pelvic region    2. Low back pain    3. Segmental dysfunction of lumbar region    4. Spasm of muscle    5. Thoracic segment dysfunction        Patient's condition:  Patient had restrictions pre-manipulation    Treatment effectiveness:  Post manipulation there is better intersegmental movement and Patient claims to feel looser post manipulation      Procedures:  CMT:  61103 Chiropractic manipulative treatment 3-4 regions performed   Thoracic: Activator, T10, T11, T12, Prone  Lumbar: Activator, L4, L5, Prone  Pelvis: Drop Table, PSIS Right , Prone    Modalities:  None performed this visit    Therapeutic procedures:  None    Response to Treatment  Reduction in symptoms as reported by patient    Prognosis: Good    Progress towards Goals:  Patient is making progress towards the goal.     Recommendations:    Instructions:  ice 20 minutes every other hour as needed    Follow-up:    Return to care in one week.

## 2019-03-12 RX ORDER — METOPROLOL SUCCINATE 100 MG/1
TABLET, EXTENDED RELEASE ORAL
Qty: 90 TABLET | Refills: 1 | Status: SHIPPED | OUTPATIENT
Start: 2019-03-12

## 2019-03-12 NOTE — TELEPHONE ENCOUNTER
Prescription approved per [facility] Refill Protocol.    Shelley LIVINGSTON RN

## 2019-03-18 ENCOUNTER — THERAPY VISIT (OUTPATIENT)
Dept: CHIROPRACTIC MEDICINE | Facility: CLINIC | Age: 54
End: 2019-03-18
Payer: COMMERCIAL

## 2019-03-18 DIAGNOSIS — M62.838 SPASM OF MUSCLE: ICD-10-CM

## 2019-03-18 DIAGNOSIS — M99.03 SEGMENTAL DYSFUNCTION OF LUMBAR REGION: ICD-10-CM

## 2019-03-18 DIAGNOSIS — M99.05 SEGMENTAL DYSFUNCTION OF PELVIC REGION: Primary | ICD-10-CM

## 2019-03-18 DIAGNOSIS — M54.50 LUMBAGO: ICD-10-CM

## 2019-03-18 DIAGNOSIS — M99.02 THORACIC SEGMENT DYSFUNCTION: ICD-10-CM

## 2019-03-18 PROCEDURE — 98941 CHIROPRACT MANJ 3-4 REGIONS: CPT | Mod: AT | Performed by: CHIROPRACTOR

## 2019-03-18 NOTE — PROGRESS NOTES
Visit #:  4    Subjective:  Betina Tinsley is a 54 year old female who is seen in f/u up for:        Segmental dysfunction of pelvic region  Lumbago  Segmental dysfunction of lumbar region  Spasm of muscle  Thoracic segment dysfunction.     Since last visit on 3/11/2019,  Betina Tinslye reports:    Area of chief complaint:  Thoracic and Lumbar :  Symptoms are graded at 5/10. The quality is described as stiff, achey, dull.  Motion has increased, but is still not normal. Patient feels that they were improved and then she started having some intense low back pain this week.  She woke up this morning and she was feeling a little better     Objective:  The following was observed:    P: palpatory tenderness Piriformis R>>L  A: static palpation demonstrates intersegmental asymmetry , thoracic, lumbar, pelvis  R: motion palpation notes restricted motion, T10, T11 , T12 , L4 , L5  and PSIS Right   T: hypertonicity at: Traps R>>L    Segmental spinal dysfunction/restrictions found at:  T10, T11 , T12 , L4 , L5  and PSIS Right       Assessment:    Diagnoses:      1. Segmental dysfunction of pelvic region    2. Lumbago    3. Segmental dysfunction of lumbar region    4. Spasm of muscle    5. Thoracic segment dysfunction        Patient's condition:  Patient had restrictions pre-manipulation    Treatment effectiveness:  Post manipulation there is better intersegmental movement and Patient claims to feel looser post manipulation      Procedures:  CMT:  66503 Chiropractic manipulative treatment 3-4 regions performed   Thoracic: Activator, T10, T11, T12, Prone  Lumbar: Activator, L4, L5, Prone  Pelvis: Drop Table, PSIS Right , Prone    Modalities:  None performed this visit    Therapeutic procedures:  None    Response to Treatment  Reduction in symptoms as reported by patient    Prognosis: Good    Progress towards Goals: Patient is making progress towards the goal.     Recommendations:    Instructions:  ice 20 minutes every other hour  as needed    Follow-up:    Return to care in one week.

## 2019-03-20 ENCOUNTER — THERAPY VISIT (OUTPATIENT)
Dept: PHYSICAL THERAPY | Facility: CLINIC | Age: 54
End: 2019-03-20
Payer: COMMERCIAL

## 2019-03-20 DIAGNOSIS — G89.29 CHRONIC RIGHT-SIDED LOW BACK PAIN WITHOUT SCIATICA: ICD-10-CM

## 2019-03-20 DIAGNOSIS — M54.50 CHRONIC RIGHT-SIDED LOW BACK PAIN WITHOUT SCIATICA: ICD-10-CM

## 2019-03-20 DIAGNOSIS — M25.552 HIP PAIN, LEFT: ICD-10-CM

## 2019-03-20 DIAGNOSIS — M25.551 HIP PAIN, RIGHT: ICD-10-CM

## 2019-03-20 PROCEDURE — 97110 THERAPEUTIC EXERCISES: CPT | Mod: GP | Performed by: PHYSICAL THERAPIST

## 2019-03-20 PROCEDURE — 97530 THERAPEUTIC ACTIVITIES: CPT | Mod: GP | Performed by: PHYSICAL THERAPIST

## 2019-03-20 NOTE — PROGRESS NOTES
"Subjective:  HPI                    Objective:  System    Physical Exam    General     ROS    Assessment/Plan:    SUBJECTIVE  Subjective: Pt reports HEP is going well and finds she gets \"muscle sore\" doing them.  Finds primary difficulty is getting out of a chair or sidelying.  Feels like relief is lasting a little longer after chiropractic adjustments, including better over the past two days.  \"I don't have pain walking after I get going or standing.\"   Current Pain level: 3/10   Changes in function:  Yes (See Goal flowsheet attached for changes in current functional level)     Adverse reaction to treatment or activity:  None    OBJECTIVE  Objective: Peripheralization with standing lumbar flexion, centralization with standing lumbar extension.  Reduced discomfort sit to stand after sitting lumbar extension.     ASSESSMENT  Betina continues to require intervention to meet STG and LTG's: PT  Patient is progressing as expected.  Response to therapy has shown an improvement in  function  Progress made towards STG/LTG?  Yes (See Goal flowsheet attached for updates on achievement of STG and LTG)    PLAN  Improved response to extension in clinic today, although did not do prone.  Assess response to more targeted timing of extension.    PTA/ATC plan:  N/A    Please refer to the daily flowsheet for treatment today, total treatment time and time spent performing 1:1 timed codes.        "

## 2019-03-26 ENCOUNTER — THERAPY VISIT (OUTPATIENT)
Dept: PHYSICAL THERAPY | Facility: CLINIC | Age: 54
End: 2019-03-26
Payer: COMMERCIAL

## 2019-03-26 ENCOUNTER — THERAPY VISIT (OUTPATIENT)
Dept: CHIROPRACTIC MEDICINE | Facility: CLINIC | Age: 54
End: 2019-03-26
Payer: COMMERCIAL

## 2019-03-26 DIAGNOSIS — M99.03 SEGMENTAL DYSFUNCTION OF LUMBAR REGION: ICD-10-CM

## 2019-03-26 DIAGNOSIS — M25.552 HIP PAIN, LEFT: ICD-10-CM

## 2019-03-26 DIAGNOSIS — M99.02 THORACIC SEGMENT DYSFUNCTION: ICD-10-CM

## 2019-03-26 DIAGNOSIS — M62.838 SPASM OF MUSCLE: ICD-10-CM

## 2019-03-26 DIAGNOSIS — M54.50 LUMBAGO: ICD-10-CM

## 2019-03-26 DIAGNOSIS — M99.05 SEGMENTAL DYSFUNCTION OF PELVIC REGION: Primary | ICD-10-CM

## 2019-03-26 DIAGNOSIS — G89.29 CHRONIC RIGHT-SIDED LOW BACK PAIN WITHOUT SCIATICA: ICD-10-CM

## 2019-03-26 DIAGNOSIS — M54.50 CHRONIC RIGHT-SIDED LOW BACK PAIN WITHOUT SCIATICA: ICD-10-CM

## 2019-03-26 DIAGNOSIS — M25.551 HIP PAIN, RIGHT: ICD-10-CM

## 2019-03-26 PROCEDURE — 97530 THERAPEUTIC ACTIVITIES: CPT | Mod: GP | Performed by: PHYSICAL THERAPIST

## 2019-03-26 PROCEDURE — 98941 CHIROPRACT MANJ 3-4 REGIONS: CPT | Mod: AT | Performed by: CHIROPRACTOR

## 2019-03-26 PROCEDURE — 97110 THERAPEUTIC EXERCISES: CPT | Mod: GP | Performed by: PHYSICAL THERAPIST

## 2019-03-26 NOTE — PROGRESS NOTES
Visit #:  5    Subjective:  Betina Tinsley is a 54 year old female who is seen in f/u up for:        Segmental dysfunction of pelvic region  Lumbago  Segmental dysfunction of lumbar region  Spasm of muscle  Thoracic segment dysfunction.     Since last visit on 3/18/2019,  Betina Tinsley reports:    Area of chief complaint:  Thoracic and Lumbar :  Symptoms are graded at 2/10. The quality is described as stiff, achey, dull.  Motion has increased. Patient feels that they were improved and says that she is only having some pain/discomfort when she gets up from sitting for a while.  Then after moving she feels better.  She will be working with PT on this.       Objective:  The following was observed:    P: palpatory tenderness Piriformis R>>L  A: static palpation demonstrates intersegmental asymmetry , thoracic, lumbar, pelvis  R: motion palpation notes restricted motion, T10, T11 , T12 , L4 , L5  and PSIS Right   T: hypertonicity at: Traps R>>L    Segmental spinal dysfunction/restrictions found at:  T10, T11 , T12 , L4 , L5  and PSIS Right       Assessment:    Diagnoses:      1. Segmental dysfunction of pelvic region    2. Lumbago    3. Segmental dysfunction of lumbar region    4. Spasm of muscle    5. Thoracic segment dysfunction        Patient's condition:  Patient had restrictions pre-manipulation    Treatment effectiveness:  Post manipulation there is better intersegmental movement and Patient claims to feel looser post manipulation      Procedures:  CMT:  35905 Chiropractic manipulative treatment 3-4 regions performed   Thoracic: Activator, T10, T11, T12, Prone  Lumbar: Activator, L4, L5, Prone  Pelvis: Drop Table, PSIS Right , Prone    Modalities:  None performed this visit    Therapeutic procedures:  None    Response to Treatment  Reduction in symptoms as reported by patient    Prognosis: Good    Progress towards Goals: Patient is making progress towards the goal.     Recommendations:    Instructions:  ice 20  minutes every other hour as needed    Follow-up:    Return to care if symptoms persist

## 2019-03-26 NOTE — LETTER
[facility]IC [facility] [place] [facility]  [address]  [address]  [place] [id number] [phone]    May 28, 2019    Re: Betina Tinsley   :   1965  MRN:  [id number]   REFERRING PHYSICIAN:   Gwendolyn Nielsen    [facility] [place] [facility]    Date of Initial Evaluation: 19  Visits:  Rxs Used: 4  Reason for Referral:     Chronic right-sided low back pain without sciatica  Hip pain, left  Hip pain, right    DISCHARGE REPORT    Progress reporting period is from 2019 to 2019.   Patient has not returned to therapy so current subjective and objective findings are unknown.  The subjective and objective information are from the last visit (2019) with this patient.    SUBJECTIVE  Subjective: Pt reports she generally does well once she gets going.  Can be sore if not moving or stepping down curbs.  Has found standing extension to feel quite good, although hasn't tried doing it sitting (as was discussed last session).   Current Pain level: 4/10   Initial Pain level: 7/10     OBJECTIVE  Objective: Pain behaviors sit to stand but pt also had not done extension prior to standing.      ASSESSMENT/PLAN  Updated problem list and treatment plan: home program  STG/LTGs have been met or progress has been made towards goals:  N/A  Assessment of Progress: The patient has not returned to therapy. Current status is unknown.  Self Management Plans:  Patient has been instructed in a home treatment program.  Betina continues to require the following intervention to meet STG and LTG's: PT intervention is no longer required to meet STG/LTG.    Recommendations:  Pt last seen in PT 2019.  She has since no showed and cancelled with no further PT scheduled.  Discharge to [facility].    Thank you for your referral.    INQUIRIES  Therapist: Tahir Hoffman, PT, ATC, [facility] A[facility]LETIC [facility] [place] [facility]  [address]  [address]  [place] [address]  Phone:  [phone]  Fax: [phone]

## 2019-04-10 ENCOUNTER — THERAPY VISIT (OUTPATIENT)
Dept: CHIROPRACTIC MEDICINE | Facility: CLINIC | Age: 54
End: 2019-04-10
Payer: COMMERCIAL

## 2019-04-10 DIAGNOSIS — M25.674 FOOT JOINT STIFFNESS, BILATERAL: ICD-10-CM

## 2019-04-10 DIAGNOSIS — M99.02 THORACIC SEGMENT DYSFUNCTION: ICD-10-CM

## 2019-04-10 DIAGNOSIS — M99.03 SEGMENTAL DYSFUNCTION OF LUMBAR REGION: ICD-10-CM

## 2019-04-10 DIAGNOSIS — M54.50 LUMBAGO: ICD-10-CM

## 2019-04-10 DIAGNOSIS — M99.05 SEGMENTAL DYSFUNCTION OF PELVIC REGION: Primary | ICD-10-CM

## 2019-04-10 DIAGNOSIS — M54.2 CERVICALGIA: ICD-10-CM

## 2019-04-10 DIAGNOSIS — M25.675 FOOT JOINT STIFFNESS, BILATERAL: ICD-10-CM

## 2019-04-10 DIAGNOSIS — M99.06 SOMATIC DYSFUNCTION OF LOWER EXTREMITIES: ICD-10-CM

## 2019-04-10 DIAGNOSIS — M62.838 SPASM OF MUSCLE: ICD-10-CM

## 2019-04-10 PROCEDURE — 98941 CHIROPRACT MANJ 3-4 REGIONS: CPT | Mod: AT | Performed by: CHIROPRACTOR

## 2019-04-10 PROCEDURE — 98943 CHIROPRACT MANJ XTRSPINL 1/>: CPT | Performed by: CHIROPRACTOR

## 2019-04-10 NOTE — PROGRESS NOTES
Visit #:  6    Subjective:  Betina Tinsley is a 54 year old female who is seen in f/u up for:        Segmental dysfunction of pelvic region  Lumbago  Segmental dysfunction of lumbar region  Spasm of muscle  Thoracic segment dysfunction.     Since last visit on 3/26/2019,  Betina Tinsley reports:    Area of chief complaint:  Thoracic and Lumbar :  Symptoms are graded at 5/10. The quality is described as stiff, achey, dull.  Motion has increased. Patient feels that they were improved until yesterday when she fell while exiting a bus.  There was no head trauma and she more or less stumbled out of the bus by missing a step.  She is having some low back and neck pain as a result.  She also reports that her feet are feeling stiff and sore.     Objective:  The following was observed:    P: palpatory tenderness Piriformis R>>L  A: static palpation demonstrates intersegmental asymmetry , thoracic, lumbar, pelvis  R: motion palpation notes restricted motion, T10, T11 , T12 , L4 , L5  and PSIS Right, metatarsal heads and navicular   T: hypertonicity at: Traps R>>L    Segmental spinal dysfunction/restrictions found at:  T10, T11 , T12 , L4 , L5  and PSIS Right, metatarsal heads and navicular      Assessment:    Diagnoses:      1. Segmental dysfunction of pelvic region    2. Lumbago    3. Segmental dysfunction of lumbar region    4. Spasm of muscle    5. Thoracic segment dysfunction   Somatic dysfunction of lower extremities  Foot stiffness         Patient's condition:  Patient had restrictions pre-manipulation    Treatment effectiveness:  Post manipulation there is better intersegmental movement and Patient claims to feel looser post manipulation      Procedures:  CMT:  02348 Chiropractic manipulative treatment 3-4 regions performed  03249 Chiropractic manipulative treatment to lower extremities   Thoracic: Activator, T10, T11, T12, Prone  Lumbar: Activator, L4, L5, Prone  Pelvis: Drop Table, PSIS Right , Prone  Foot:  activator, diversified, metatarsal heads, navicular supine    Modalities:  None performed this visit    Therapeutic procedures:  None    Response to Treatment  Reduction in symptoms as reported by patient    Prognosis: Good    Progress towards Goals: Patient is making progress towards the goal.     Recommendations:    Instructions:  ice 20 minutes every other hour as needed    Follow-up:    Return to care if symptoms persist

## 2019-05-28 PROBLEM — G89.29 CHRONIC RIGHT-SIDED LOW BACK PAIN WITHOUT SCIATICA: Status: RESOLVED | Noted: 2019-02-26 | Resolved: 2019-05-28

## 2019-05-28 PROBLEM — M25.552 HIP PAIN, LEFT: Status: RESOLVED | Noted: 2019-02-26 | Resolved: 2019-05-28

## 2019-05-28 PROBLEM — M54.50 CHRONIC RIGHT-SIDED LOW BACK PAIN WITHOUT SCIATICA: Status: RESOLVED | Noted: 2019-02-26 | Resolved: 2019-05-28

## 2019-05-28 PROBLEM — M25.551 HIP PAIN, RIGHT: Status: RESOLVED | Noted: 2019-02-26 | Resolved: 2019-05-28

## 2019-06-17 ENCOUNTER — THERAPY VISIT (OUTPATIENT)
Dept: CHIROPRACTIC MEDICINE | Facility: CLINIC | Age: 54
End: 2019-06-17
Payer: COMMERCIAL

## 2019-06-17 DIAGNOSIS — M99.03 SEGMENTAL DYSFUNCTION OF LUMBAR REGION: ICD-10-CM

## 2019-06-17 DIAGNOSIS — M62.838 SPASM OF MUSCLE: ICD-10-CM

## 2019-06-17 DIAGNOSIS — M99.05 SEGMENTAL DYSFUNCTION OF PELVIC REGION: Primary | ICD-10-CM

## 2019-06-17 DIAGNOSIS — M99.02 THORACIC SEGMENT DYSFUNCTION: ICD-10-CM

## 2019-06-17 DIAGNOSIS — M54.50 LUMBAGO: ICD-10-CM

## 2019-06-17 PROCEDURE — 98941 CHIROPRACT MANJ 3-4 REGIONS: CPT | Mod: AT | Performed by: CHIROPRACTOR

## 2019-06-17 NOTE — PROGRESS NOTES
Visit #:  7    Subjective:  Betina Tinsley is a 54 year old female who is seen in f/u up for:        Segmental dysfunction of pelvic region  Lumbago  Segmental dysfunction of lumbar region  Spasm of muscle  Thoracic segment dysfunction.     Since last visit on 4/10/2019,  Betina Tinsley reports:    Area of chief complaint:  Thoracic and Lumbar :  Symptoms are graded at 5/10. The quality is described as stiff, achey, dull.  Motion has increased. Patient feels that they were improved until a couple of weeks ago when she started having some low back pain/stiffness.  She did not do anything in particular, just started having some back pain.     Objective:  The following was observed:    P: palpatory tenderness Piriformis R>>L  A: static palpation demonstrates intersegmental asymmetry , thoracic, lumbar, pelvis  R: motion palpation notes restricted motion, T10, T11 , T12 , L4 , L5  and PSIS Right,   T: hypertonicity at: Traps R>>L    Segmental spinal dysfunction/restrictions found at:  T10, T11 , T12 , L4 , L5  and PSIS Right,      Assessment:    Diagnoses:      1. Segmental dysfunction of pelvic region    2. Lumbago    3. Segmental dysfunction of lumbar region    4. Spasm of muscle    5. Thoracic segment dysfunction        Patient's condition:  Patient had restrictions pre-manipulation    Treatment effectiveness:  Post manipulation there is better intersegmental movement and Patient claims to feel looser post manipulation      Procedures:  CMT:  74525 Chiropractic manipulative treatment 3-4 regions performed  Thoracic: Activator, T10, T11, T12, Prone  Lumbar: Activator, L4, L5, Prone  Pelvis: Drop Table, PSIS Right , Prone      Modalities:  None performed this visit    Therapeutic procedures:  None    Response to Treatment  Reduction in symptoms as reported by patient    Prognosis: Good    Progress towards Goals: Patient is making progress towards the goal.     Recommendations:    Instructions:  ice 20 minutes every  other hour as needed    Follow-up:    Return to care if symptoms persist

## 2019-07-03 DIAGNOSIS — E78.5 HYPERLIPIDEMIA LDL GOAL <100: ICD-10-CM

## 2019-07-03 RX ORDER — SIMVASTATIN 10 MG
TABLET ORAL
Qty: 90 TABLET | Refills: 1 | Status: SHIPPED | OUTPATIENT
Start: 2019-07-03

## 2019-07-03 NOTE — TELEPHONE ENCOUNTER
1/31/19  3:33 PM     LIPID PANEL W REFLEX MEASURED LDL   Order: [id number]   (suggestion)  Information displayed in this report will not trend or trigger automated decision support.    Ref Range & Units Value   CHOLESTEROL,TOTAL 100 - 199 mg/dL 191    TRIGLYCERIDES <150 mg/dL 148    HDL CHOLESTEROL >40 mg/dL 48    NON-HDL CHOLESTEROL <145 mg/dl 143    CHOL/HDL RATIO            <4.50  3.98    LDL CHOLESTEROL <=130 mg/dL 113    PROVIDER ORDERED STATUS   RANDOM    Resulting Agency  [facility]   Called and  Spoke with patient    Pt PCP is Dr Nielsen at [facility]   Will sent refill under her name  DOMINIQUE Saini  RN/[place] Mcnulty

## 2019-07-15 ENCOUNTER — THERAPY VISIT (OUTPATIENT)
Dept: CHIROPRACTIC MEDICINE | Facility: CLINIC | Age: 54
End: 2019-07-15
Payer: COMMERCIAL

## 2019-07-15 DIAGNOSIS — M99.05 SEGMENTAL DYSFUNCTION OF PELVIC REGION: Primary | ICD-10-CM

## 2019-07-15 DIAGNOSIS — M62.838 SPASM OF MUSCLE: ICD-10-CM

## 2019-07-15 DIAGNOSIS — M99.03 SEGMENTAL DYSFUNCTION OF LUMBAR REGION: ICD-10-CM

## 2019-07-15 DIAGNOSIS — M54.50 LUMBAGO: ICD-10-CM

## 2019-07-15 DIAGNOSIS — M99.02 THORACIC SEGMENT DYSFUNCTION: ICD-10-CM

## 2019-07-15 PROCEDURE — 98941 CHIROPRACT MANJ 3-4 REGIONS: CPT | Mod: AT | Performed by: CHIROPRACTOR

## 2019-07-15 NOTE — PROGRESS NOTES
Visit #:  8    Subjective:  Betina Tinsley is a 54 year old female who is seen in f/u up for:        Segmental dysfunction of pelvic region  Lumbago  Segmental dysfunction of lumbar region  Spasm of muscle  Thoracic segment dysfunction.     Since last visit on 4/6/17/2019,  Betina Tinsley reports:    Area of chief complaint:  Thoracic and Lumbar :  Symptoms are graded at 5/10. The quality is described as stiff, achey, dull.  Motion has was good until a couple of weeks ago when it decreased. Patient feels that they were improved until a couple of weeks ago when she started having some low back pain/stiffness.  She did not do anything in particular, just started having some back pain.  She feels that she may have slept awkwardly and then started having some pain.     Objective:  The following was observed:    P: palpatory tenderness Piriformis R>>L  A: static palpation demonstrates intersegmental asymmetry , thoracic, lumbar, pelvis  R: motion palpation notes restricted motion, T10, T11 , T12 , L4 , L5  and PSIS Right,   T: hypertonicity at: Traps R>>L    Segmental spinal dysfunction/restrictions found at:  T10, T11 , T12 , L4 , L5  and PSIS Right,      Assessment:    Diagnoses:      1. Segmental dysfunction of pelvic region    2. Lumbago    3. Segmental dysfunction of lumbar region    4. Spasm of muscle    5. Thoracic segment dysfunction        Patient's condition:  Patient had restrictions pre-manipulation    Treatment effectiveness:  Post manipulation there is better intersegmental movement and Patient claims to feel looser post manipulation      Procedures:  CMT:  13399 Chiropractic manipulative treatment 3-4 regions performed  Thoracic: Activator, T10, T11, T12, Prone  Lumbar: Activator, L4, L5, Prone  Pelvis: Drop Table, PSIS Right , Prone      Modalities:  None performed this visit    Therapeutic procedures:  None    Response to Treatment  Reduction in symptoms as reported by patient    Prognosis:  Good    Progress towards Goals: Patient is making progress towards the goal.     Recommendations:    Instructions:  ice 20 minutes every other hour as needed    Follow-up:    Return to care if symptoms persist

## 2019-07-17 ENCOUNTER — THERAPY VISIT (OUTPATIENT)
Dept: CHIROPRACTIC MEDICINE | Facility: CLINIC | Age: 54
End: 2019-07-17
Payer: COMMERCIAL

## 2019-07-17 DIAGNOSIS — M54.50 LUMBAGO: ICD-10-CM

## 2019-07-17 DIAGNOSIS — M99.03 SEGMENTAL DYSFUNCTION OF LUMBAR REGION: ICD-10-CM

## 2019-07-17 DIAGNOSIS — M99.05 SEGMENTAL DYSFUNCTION OF PELVIC REGION: Primary | ICD-10-CM

## 2019-07-17 DIAGNOSIS — M62.838 SPASM OF MUSCLE: ICD-10-CM

## 2019-07-17 PROCEDURE — 98940 CHIROPRACT MANJ 1-2 REGIONS: CPT | Mod: AT | Performed by: CHIROPRACTOR

## 2019-07-17 NOTE — PROGRESS NOTES
Visit #:  9    Subjective:  Betina Tinsley is a 54 year old female who is seen in f/u up for:        Segmental dysfunction of pelvic region  Lumbago  Segmental dysfunction of lumbar region  Spasm of muscle  Thoracic segment dysfunction.     Since last visit on 7/15/2019,  Betina Tinsley reports:    Area of chief complaint:  Thoracic and Lumbar :  Symptoms are graded at 5/10. The quality is described as stiff, achey, dull.  Motion has improved but is not normal.  Betina reports that she is still feeling some pinching pain in her low back.       Objective:  The following was observed:    P: palpatory tenderness Piriformis R>>L  A: static palpation demonstrates intersegmental asymmetry , thoracic, lumbar, pelvis  R: motion palpation notes restricted motion,  L4 , L5  and PSIS Right,   T: hypertonicity at: Traps R>>L    Segmental spinal dysfunction/restrictions found at:  L4 , L5  and PSIS Right,      Assessment:    Diagnoses:      1. Segmental dysfunction of pelvic region    2. Lumbago    3. Segmental dysfunction of lumbar region    4. Spasm of muscle    5. Thoracic segment dysfunction        Patient's condition:  Patient had restrictions pre-manipulation    Treatment effectiveness:  Post manipulation there is better intersegmental movement and Patient claims to feel looser post manipulation      Procedures:  CMT:  60094 Chiropractic manipulative treatment 1-2 regions performed  Lumbar: Activator, L4, L5, Prone  Pelvis: Drop Table, PSIS Right , Prone      Modalities:  None performed this visit    Therapeutic procedures:  None    Response to Treatment  Reduction in symptoms as reported by patient    Prognosis: Good    Progress towards Goals: Patient is making progress towards the goal.     Recommendations:    Instructions:  ice 20 minutes every other hour as needed    Follow-up:    Return to care if symptoms persist

## 2019-08-20 ENCOUNTER — THERAPY VISIT (OUTPATIENT)
Dept: CHIROPRACTIC MEDICINE | Facility: CLINIC | Age: 54
End: 2019-08-20
Payer: COMMERCIAL

## 2019-08-20 DIAGNOSIS — M99.05 SEGMENTAL DYSFUNCTION OF PELVIC REGION: Primary | ICD-10-CM

## 2019-08-20 DIAGNOSIS — M54.50 LUMBAGO: ICD-10-CM

## 2019-08-20 DIAGNOSIS — M99.03 SEGMENTAL DYSFUNCTION OF LUMBAR REGION: ICD-10-CM

## 2019-08-20 DIAGNOSIS — M54.2 CERVICALGIA: ICD-10-CM

## 2019-08-20 DIAGNOSIS — M62.838 SPASM OF MUSCLE: ICD-10-CM

## 2019-08-20 DIAGNOSIS — M99.01 CERVICAL SEGMENT DYSFUNCTION: ICD-10-CM

## 2019-08-20 DIAGNOSIS — M99.02 THORACIC SEGMENT DYSFUNCTION: ICD-10-CM

## 2019-08-20 PROCEDURE — 98941 CHIROPRACT MANJ 3-4 REGIONS: CPT | Mod: AT | Performed by: CHIROPRACTOR

## 2019-08-20 NOTE — PROGRESS NOTES
Visit #:  10    Subjective:  Betina Tinsley is a 54 year old female who is seen in f/u up for:        Segmental dysfunction of pelvic region  Lumbago  Segmental dysfunction of lumbar region  Spasm of muscle  Thoracic segment dysfunction  Cervicalgia  Cervical segment dysfunction.     Since last visit on 7/17/2019,  Betina Tinsley reports:    Area of chief complaint:  Cervical and Lumbar :  Symptoms are graded at 5/10. The quality is described as stiff, achey, dull.  Motion has remained about the same, no improvement. Patient feels that they are about the same.  She get some relief for about 2 days after treatment and the pain returns. She is feeling very stiff and sore in her neck and low back.  She has been under a lot of stress at work this past summer    Objective:  The following was observed:    P: palpatory tenderness Piriformis and Traps   A: static palpation demonstrates intersegmental asymmetry , cervical, thoracic, lumbar, pelvis  R: motion palpation notes restricted motion, C6 , C7 , T1 , T2 , T6 , T7 , T8 , L4 , L5  and PSIS Right   T: hypertonicity at: Piriformis and Traps     Segmental spinal dysfunction/restrictions found at:  C6 , C7 , T1 , T2 , T6 , T7 , T8 , L4 , L5  and PSIS Right       Assessment:    Diagnoses:      1. Segmental dysfunction of pelvic region    2. Lumbago    3. Segmental dysfunction of lumbar region    4. Spasm of muscle    5. Thoracic segment dysfunction    6. Cervicalgia    7. Cervical segment dysfunction        Patient's condition:  Patient had restrictions pre-manipulation    Treatment effectiveness:  Post manipulation there is better intersegmental movement and Patient claims to feel looser post manipulation      Procedures:  CMT:  62582 Chiropractic manipulative treatment 3-4 regions performed   Cervical: Activator, C6, C7 , Prone  Thoracic: Activator, T1, T2, T6, T7, T8, Prone  Lumbar: Activator, L4, L5, Prone  Pelvis: Drop Table, PSIS Right , Prone    Modalities:  20144:  IASTM: To Piriformis and Traps for 5 min    Therapeutic procedures:  None    Response to Treatment  Reduction in symptoms as reported by patient    Prognosis: Good    Progress towards Goals: Patient is making progress towards the goal.     Recommendations:    Instructions:  ice 20 minutes every other hour as needed    Follow-up:    Return to care in one week  Follow up with PT.

## 2019-08-27 ENCOUNTER — THERAPY VISIT (OUTPATIENT)
Dept: CHIROPRACTIC MEDICINE | Facility: CLINIC | Age: 54
End: 2019-08-27
Payer: COMMERCIAL

## 2019-08-27 DIAGNOSIS — M54.2 CERVICALGIA: ICD-10-CM

## 2019-08-27 DIAGNOSIS — M99.05 SEGMENTAL DYSFUNCTION OF PELVIC REGION: Primary | ICD-10-CM

## 2019-08-27 DIAGNOSIS — M54.50 LUMBAGO: ICD-10-CM

## 2019-08-27 DIAGNOSIS — M62.838 SPASM OF MUSCLE: ICD-10-CM

## 2019-08-27 DIAGNOSIS — M99.02 THORACIC SEGMENT DYSFUNCTION: ICD-10-CM

## 2019-08-27 DIAGNOSIS — M99.03 SEGMENTAL DYSFUNCTION OF LUMBAR REGION: ICD-10-CM

## 2019-08-27 DIAGNOSIS — M99.01 CERVICAL SEGMENT DYSFUNCTION: ICD-10-CM

## 2019-08-27 PROCEDURE — 98941 CHIROPRACT MANJ 3-4 REGIONS: CPT | Mod: AT | Performed by: CHIROPRACTOR

## 2019-08-27 NOTE — PROGRESS NOTES
Visit #:  11    Subjective:  Betina Tinsley is a 54 year old female who is seen in f/u up for:        Segmental dysfunction of pelvic region  Lumbago  Segmental dysfunction of lumbar region  Spasm of muscle  Thoracic segment dysfunction  Cervicalgia  Cervical segment dysfunction.     Since last visit on 8/20/2019,  Betina Tinsley reports:    Area of chief complaint:  Cervical and Lumbar :  Symptoms are graded at 5/10. The quality is described as stiff, achey, dull.  Motion has improved but not normal. Patient feels that they were feeling better until 2 days ago when she woke up and her low back and neck were stiff.  This [place] have been [place]    Objective:  The following was observed:    P: palpatory tenderness Piriformis and Traps   A: static palpation demonstrates intersegmental asymmetry , cervical, thoracic, lumbar, pelvis  R: motion palpation notes restricted motion, C6 , C7 , T1 , T2 , T6 , T7 , T8 , L4 , L5  and PSIS Right   T: hypertonicity at: Piriformis and Traps     Segmental spinal dysfunction/restrictions found at:  C6 , C7 , T1 , T2 , T6 , T7 , T8 , L4 , L5  and PSIS Right       Assessment:    Diagnoses:      1. Segmental dysfunction of pelvic region    2. Lumbago    3. Segmental dysfunction of lumbar region    4. Spasm of muscle    5. Thoracic segment dysfunction    6. Cervicalgia    7. Cervical segment dysfunction        Patient's condition:  Patient had restrictions pre-manipulation    Treatment effectiveness:  Post manipulation there is better intersegmental movement and Patient claims to feel looser post manipulation      Procedures:  CMT:  02855 Chiropractic manipulative treatment 3-4 regions performed   Cervical: Activator, C6, C7 , Prone  Thoracic: Activator, T1, T2, T6, T7, T8, Prone  Lumbar: Activator, L4, L5, Prone  Pelvis: Drop Table, PSIS Right , Prone    Modalities:  59895: IASTM: To Piriformis and Traps for 5 min    Therapeutic  procedures:  None    Response to Treatment  Reduction in symptoms as reported by patient    Prognosis: Good    Progress towards Goals: Patient is making progress towards the goal.     Recommendations:    Instructions:  ice 20 minutes every other hour as needed    Follow-up:    Return to care in one week  Follow up with PT.

## 2019-08-28 DIAGNOSIS — I10 BENIGN ESSENTIAL HYPERTENSION: ICD-10-CM

## 2019-08-28 DIAGNOSIS — R00.8 TRIGEMINY: ICD-10-CM

## 2019-08-28 RX ORDER — METOPROLOL SUCCINATE 100 MG/1
TABLET, EXTENDED RELEASE ORAL
Qty: 90 TABLET | Refills: 1 | OUTPATIENT
Start: 2019-08-28

## 2019-08-28 NOTE — TELEPHONE ENCOUNTER
"Requested Prescriptions   Pending Prescriptions Disp Refills     metoprolol succinate ER (TOPROL-XL) 100 MG 24 hr tablet [Pharmacy Med Name: METOPROLOL SUCC  MG TAB]  Last Written Prescription Date:  3/12/19  Last Fill Quantity: 90,  # refills: 1   Last office visit: 10/15/2018 with prescribing provider:  abdulaziz   Future Office Visit:     90 tablet 1     Sig: TAKE 1 TABLET (100 MG) BY MOUTH DAILY       Beta-Blockers Protocol Passed - 8/28/2019 11:11 AM        Passed - Blood pressure under 140/90 in past 12 months     BP Readings from Last 3 Encounters:   10/15/18 112/74   08/13/18 126/80   04/25/18 105/75                 Passed - Patient is age 6 or older        Passed - Recent (12 mo) or future (30 days) visit within the authorizing provider's specialty     Patient had office visit in the last 12 months or has a visit in the next 30 days with authorizing provider or within the authorizing provider's specialty.  See \"Patient Info\" tab in inbasket, or \"Choose Columns\" in Meds & Orders section of the refill encounter.              Passed - Medication is active on med list          "

## 2019-08-28 NOTE — TELEPHONE ENCOUNTER
Pt is now being seen at [facility], Gwendolyn Pierson MD.  Shelley LIVINGSTON RN

## 2019-09-11 ENCOUNTER — THERAPY VISIT (OUTPATIENT)
Dept: PHYSICAL THERAPY | Facility: CLINIC | Age: 54
End: 2019-09-11
Payer: COMMERCIAL

## 2019-09-11 DIAGNOSIS — M54.50 CHRONIC BILATERAL LOW BACK PAIN WITHOUT SCIATICA: Primary | ICD-10-CM

## 2019-09-11 DIAGNOSIS — G89.29 CHRONIC BILATERAL LOW BACK PAIN WITHOUT SCIATICA: Primary | ICD-10-CM

## 2019-09-11 PROCEDURE — 97164 PT RE-EVAL EST PLAN CARE: CPT | Mod: GP | Performed by: PHYSICAL THERAPIST

## 2019-09-11 PROCEDURE — 97110 THERAPEUTIC EXERCISES: CPT | Mod: GP | Performed by: PHYSICAL THERAPIST

## 2019-09-11 NOTE — PROGRESS NOTES
"Subjective:  HPI                    Objective:  System    Physical Exam    General     ROS    Assessment/Plan:    PROGRESS  REPORT    Progress reporting period is from 02/26/2019 to 09/11/2019.       SUBJECTIVE  Subjective: Pt reports she was diagnosed with kidney stones last spring, shortly after last being seen in PT.  Had multiple surgeries over the summer to remove them.  Pt states she has \"fallen by the wayside\" since then.  She has continued to see chiropractic as in chart and has found these helpful.  Pt reports she is \"still achy\" in the lower back.  Can be most sore when first starting to walk (especially if has been sitting for about 20 minutes or more), lifting leg to get into car.  Would like to work on balance and strength.  Finds R leg is sore with walking by the end of the day; most comfortable first thing in the morning.  Feels like is currently getting better.    Current Pain level: 6/10.     Initial Pain level: 7/10.   Changes in function:  Yes (See Goal flowsheet attached for changes in current functional level)  Adverse reaction to treatment or activity: None    OBJECTIVE  Objective: Standing lumbar flexion moderate restriction and pt reports feeling \"tight.\"  Standing lumbar extension moderate restriction without discomfort.  Moderate restriction B SG \"tight.\"  Negative SLR, Sergio, thigh thrust, FADIR B.  Positive hip PROM R IR but not ER.  TA MMT 1/5.     ASSESSMENT/PLAN  Updated problem list and treatment plan: Diagnosis 1:  LBP  Pain -  hot/cold therapy  Decreased ROM/flexibility - manual therapy and therapeutic exercise  Decreased strength - therapeutic exercise and therapeutic activities  Decreased function - therapeutic activities  STG/LTGs have been met or progress has been made towards goals:  Yes (See Goal flow sheet completed today.)  Assessment of Progress: The patient's condition has potential to improve.  Self Management Plans:  Patient has been instructed in a home treatment " program.  I have re-evaluated this patient and find that the nature, scope, duration and intensity of the therapy is appropriate for the medical condition of the patient.  Betina continues to require the following intervention to meet STG and LTG's:  PT    Recommendations:  Pt returns to PT today for first time in nearly six months.  Revised plan of care to revisit consistency of HEP, stabilization, balance work.  Once per week for eight weeks.    Please refer to the daily flowsheet for treatment today, total treatment time and time spent performing 1:1 timed codes.

## 2019-09-11 NOTE — LETTER
"[facility]  [address]  [address]  [place] [address]  [phone]    2019    Re: Betina Tinsley   :   1965  MRN:  [id number]   REFERRING PHYSICIAN:   Gwendolyn ROUSEM [place] [facility]    Date of Initial Evaluation: 19  Visits:  Rxs Used: 5  Reason for Referral:  Chronic bilateral low back pain without sciatica    PROGRESS  REPORT    Progress reporting period is from 2019 to 2019.       SUBJECTIVE  Subjective: Pt reports she was diagnosed with kidney stones last spring, shortly after last being seen in PT.  Had multiple surgeries over the summer to remove them.  Pt states she has \"fallen by the wayside\" since then.  She has continued to see chiropractic as in chart and has found these helpful.  Pt reports she is \"still achy\" in the lower back.  Can be most sore when first starting to walk (especially if has been sitting for about 20 minutes or more), lifting leg to get into car.  Would like to work on balance and strength.  Finds R leg is sore with walking by the end of the day; most comfortable first thing in the morning.  Feels like is currently getting better.    Current Pain level: 6/10.     Initial Pain level: 7/10.   Changes in function:  Yes (See Goal flowsheet attached for changes in current functional level)  Adverse reaction to treatment or activity: None    OBJECTIVE  Objective: Standing lumbar flexion moderate restriction and pt reports feeling \"tight.\"  Standing lumbar extension moderate restriction without discomfort.  Moderate restriction B SG \"tight.\"  Negative SLR, Sergio, thigh thrust, FADIR B.  Positive hip PROM R IR but not ER.  TA MMT .     ASSESSMENT/PLAN  Updated problem list and treatment plan: Diagnosis 1:  LBP  Pain -  hot/cold therapy  Decreased ROM/flexibility - manual therapy and therapeutic exercise  Decreased strength - therapeutic exercise and therapeutic activities  Decreased function - therapeutic " activities  STG/LTGs have been met or progress has been made towards goals:  Yes (See Goal flow sheet completed today.)  Assessment of Progress: The patient's condition has potential to improve.  Self Management Plans:  Patient has been instructed in a home treatment program.  I have re-evaluated this patient and find that the nature, scope, duration and intensity of   Betina Tinsley   :   1965          the therapy is appropriate for the medical condition of the patient.  Betina continues to require the following intervention to meet STG and LTG's:  PT    Recommendations:  Pt returns to PT today for first time in nearly six months.  Revised plan of care to revisit consistency of HEP, stabilization, balance work.  Once per week for eight weeks.              Thank you for your referral.    INQUIRIES  Therapist: Tahir Hoffman, PT, ATC, CSCS  [facility]  [address]  [place] [address]  Phone: [phone]  Fax: [phone]

## 2019-09-11 NOTE — Clinical Note
I'm not familiar with the authorization process on this case as mentioned in the appointment notes from last spring.  We would still have an OK number of visits left but are now outside the date range.  Is there anything we need to do to ensure we're good to go from here?  Thanks!-- Tahir

## 2019-10-09 ENCOUNTER — THERAPY VISIT (OUTPATIENT)
Dept: PHYSICAL THERAPY | Facility: CLINIC | Age: 54
End: 2019-10-09
Payer: COMMERCIAL

## 2019-10-09 DIAGNOSIS — M54.50 CHRONIC BILATERAL LOW BACK PAIN WITHOUT SCIATICA: ICD-10-CM

## 2019-10-09 DIAGNOSIS — G89.29 CHRONIC BILATERAL LOW BACK PAIN WITHOUT SCIATICA: ICD-10-CM

## 2019-10-09 PROCEDURE — 97112 NEUROMUSCULAR REEDUCATION: CPT | Mod: GP | Performed by: PHYSICAL THERAPIST

## 2019-10-09 PROCEDURE — 97110 THERAPEUTIC EXERCISES: CPT | Mod: GP | Performed by: PHYSICAL THERAPIST

## 2019-10-09 PROCEDURE — 97530 THERAPEUTIC ACTIVITIES: CPT | Mod: GP | Performed by: PHYSICAL THERAPIST

## 2019-10-09 NOTE — PROGRESS NOTES
"Subjective:  HPI                    Objective:  System    Physical Exam    General     ROS    Assessment/Plan:    SUBJECTIVE  Subjective: Pt reports ongoing difficulty getting off the couch \"if I'm sitting for 10 minutes or so.  That's my only complaint.\"     Current Pain level: 0/10   Changes in function:  None     Adverse reaction to treatment or activity:  None    OBJECTIVE  Objective: Increased discomfort standing lumbar flexion, no effect standing lumbar extension.  TA MMT 1/5.     ASSESSMENT  Betina continues to require intervention to meet STG and LTG's: PT  Pleased to note some subjective improvement but would like to see sustained.  Response to therapy has shown lack of progress in  function  Progress made towards STG/LTG?  None    PLAN  Pt required extensive cues on HEP and has not been consistent with attendance as was discussed at last session.    PTA/ATC plan:  N/A    Please refer to the daily flowsheet for treatment today, total treatment time and time spent performing 1:1 timed codes.        "

## 2019-10-29 ENCOUNTER — THERAPY VISIT (OUTPATIENT)
Dept: PHYSICAL THERAPY | Facility: CLINIC | Age: 54
End: 2019-10-29
Payer: COMMERCIAL

## 2019-10-29 DIAGNOSIS — G89.29 CHRONIC BILATERAL LOW BACK PAIN WITHOUT SCIATICA: ICD-10-CM

## 2019-10-29 DIAGNOSIS — M54.50 CHRONIC BILATERAL LOW BACK PAIN WITHOUT SCIATICA: ICD-10-CM

## 2019-10-29 PROCEDURE — 97530 THERAPEUTIC ACTIVITIES: CPT | Mod: GP | Performed by: PHYSICAL THERAPIST

## 2019-10-29 PROCEDURE — 97110 THERAPEUTIC EXERCISES: CPT | Mod: GP | Performed by: PHYSICAL THERAPIST

## 2019-10-29 PROCEDURE — 97112 NEUROMUSCULAR REEDUCATION: CPT | Mod: GP | Performed by: PHYSICAL THERAPIST

## 2019-10-29 NOTE — PROGRESS NOTES
"Subjective:  HPI                    Objective:  System    Physical Exam    General     ROS    Assessment/Plan:    SUBJECTIVE  Subjective: Pt reports she was consistent with HEP for about five days but then wasn't at all consistent to do.  Found that she just got \"lazy\" and was tired to do so after work.  Also would have difficulty getting up and down from the floor if was sore.   Current Pain level: 0/10   Changes in function:  Yes (See Goal flowsheet attached for changes in current functional level)     Adverse reaction to treatment or activity:  None    OBJECTIVE  Objective: Increased discomfort standing lumbar flexion, negative standing lumbar extension.     ASSESSMENT  Betina continues to require intervention to meet STG and LTG's: PT  Would like to see greater consistency with HEP.  Response to therapy has shown an improvement in  function  Progress made towards STG/LTG?  Yes (See Goal flowsheet attached for updates on achievement of STG and LTG)    PLAN  Pleased that pt is seeing progress but would like to see sustained gains with HEP.  F/u in about two weeks and expect pt will have implemented strategies accordingly.    PTA/ATC plan:  N/A    Please refer to the daily flowsheet for treatment today, total treatment time and time spent performing 1:1 timed codes.        "

## 2019-10-31 ENCOUNTER — THERAPY VISIT (OUTPATIENT)
Dept: CHIROPRACTIC MEDICINE | Facility: CLINIC | Age: 54
End: 2019-10-31
Payer: COMMERCIAL

## 2019-10-31 DIAGNOSIS — M54.50 LUMBAGO: ICD-10-CM

## 2019-10-31 DIAGNOSIS — M99.05 SEGMENTAL DYSFUNCTION OF PELVIC REGION: Primary | ICD-10-CM

## 2019-10-31 DIAGNOSIS — M54.2 CERVICALGIA: ICD-10-CM

## 2019-10-31 DIAGNOSIS — M99.03 SEGMENTAL DYSFUNCTION OF LUMBAR REGION: ICD-10-CM

## 2019-10-31 DIAGNOSIS — M99.01 CERVICAL SEGMENT DYSFUNCTION: ICD-10-CM

## 2019-10-31 DIAGNOSIS — M62.838 SPASM OF MUSCLE: ICD-10-CM

## 2019-10-31 DIAGNOSIS — M99.02 THORACIC SEGMENT DYSFUNCTION: ICD-10-CM

## 2019-10-31 PROCEDURE — 98941 CHIROPRACT MANJ 3-4 REGIONS: CPT | Mod: AT | Performed by: CHIROPRACTOR

## 2019-10-31 NOTE — PROGRESS NOTES
Visit #:  12    Subjective:  Betina Tinsley is a 54 year old female who is seen in f/u up for:        Segmental dysfunction of pelvic region  Lumbago  Segmental dysfunction of lumbar region  Spasm of muscle  Thoracic segment dysfunction  Cervicalgia  Cervical segment dysfunction.     Since last visit on 8/27/2019,  Betina Tinsley reports:    Area of chief complaint:  Cervical and Lumbar :  Symptoms are graded at 57/10. The quality is described as stiff, achey, dull.  Motion has improved but not normal. Patient feels that they were feeling better until 2 days ago when she had to walk down 14 flights of stairs for a fire drill this cause her to have a lit of low back and neck stiffness.    Objective:  The following was observed:    P: palpatory tenderness Piriformis and Traps   A: static palpation demonstrates intersegmental asymmetry , cervical, thoracic, lumbar, pelvis  R: motion palpation notes restricted motion, C6 , C7 , T1 , T2 , T6 , T7 , T8 , L4 , L5  and PSIS Right   T: hypertonicity at: Piriformis and Traps     Segmental spinal dysfunction/restrictions found at:  C6 , C7 , T1 , T2 , T6 , T7 , T8 , L4 , L5  and PSIS Right       Assessment:    Diagnoses:      1. Segmental dysfunction of pelvic region    2. Lumbago    3. Segmental dysfunction of lumbar region    4. Spasm of muscle    5. Thoracic segment dysfunction    6. Cervicalgia    7. Cervical segment dysfunction        Patient's condition:  Patient had restrictions pre-manipulation    Treatment effectiveness:  Post manipulation there is better intersegmental movement and Patient claims to feel looser post manipulation      Procedures:  CMT:  10454 Chiropractic manipulative treatment 3-4 regions performed   Cervical: Activator, C6, C7 , Prone  Thoracic: Activator, T1, T2, T6, T7, T8, Prone  Lumbar: Activator, L4, L5, Prone  Pelvis: Drop Table, PSIS Right , Prone    Modalities:  17065: IASTM: To Piriformis and Traps for 5 min    Therapeutic  procedures:  None    Response to Treatment  Reduction in symptoms as reported by patient    Prognosis: Good    Progress towards Goals: Patient is making progress towards the goal.     Recommendations:    Instructions:  ice 20 minutes every other hour as needed    Follow-up:    Return to care in one week  Follow up with PT.

## 2019-11-11 ENCOUNTER — THERAPY VISIT (OUTPATIENT)
Dept: CHIROPRACTIC MEDICINE | Facility: CLINIC | Age: 54
End: 2019-11-11
Payer: COMMERCIAL

## 2019-11-11 DIAGNOSIS — M99.05 SEGMENTAL DYSFUNCTION OF PELVIC REGION: Primary | ICD-10-CM

## 2019-11-11 DIAGNOSIS — M54.2 CERVICALGIA: ICD-10-CM

## 2019-11-11 DIAGNOSIS — M99.03 SEGMENTAL DYSFUNCTION OF LUMBAR REGION: ICD-10-CM

## 2019-11-11 DIAGNOSIS — M99.02 THORACIC SEGMENT DYSFUNCTION: ICD-10-CM

## 2019-11-11 DIAGNOSIS — M54.50 LUMBALGIA: ICD-10-CM

## 2019-11-11 DIAGNOSIS — M99.01 CERVICAL SEGMENT DYSFUNCTION: ICD-10-CM

## 2019-11-11 DIAGNOSIS — M62.838 SPASM OF MUSCLE: ICD-10-CM

## 2019-11-11 PROCEDURE — 98941 CHIROPRACT MANJ 3-4 REGIONS: CPT | Mod: AT | Performed by: CHIROPRACTOR

## 2019-11-11 NOTE — PROGRESS NOTES
Visit #:  13    Subjective:  Betina Tinsley is a 54 year old female who is seen in f/u up for:        Segmental dysfunction of pelvic region  Lumbalgia  Segmental dysfunction of lumbar region  Spasm of muscle  Thoracic segment dysfunction  Cervicalgia  Cervical segment dysfunction.     Since last visit on 10/31/2019,  Betina Tinsley reports:    Area of chief complaint:  Cervical and Lumbar :  Symptoms are graded at 1/10. The quality is described as stiff, achey, dull.  Motion has improved but not normal. Patient feels that they were feeling better but just feel a little twisted, but not necessarily in pain.    Objective:  The following was observed:    P: palpatory tenderness Piriformis and Traps   A: static palpation demonstrates intersegmental asymmetry , cervical, thoracic, lumbar, pelvis  R: motion palpation notes restricted motion, C6 , C7 , T1 , T2 , T6 , T7 , T8 , L4 , L5  and PSIS Right   T: hypertonicity at: Piriformis and Traps     Segmental spinal dysfunction/restrictions found at:  C6 , C7 , T1 , T2 , T6 , T7 , T8 , L4 , L5  and PSIS Right       Assessment:    Diagnoses:      1. Segmental dysfunction of pelvic region    2. Lumbalgia    3. Segmental dysfunction of lumbar region    4. Spasm of muscle    5. Thoracic segment dysfunction    6. Cervicalgia    7. Cervical segment dysfunction        Patient's condition:  Patient had restrictions pre-manipulation    Treatment effectiveness:  Post manipulation there is better intersegmental movement and Patient claims to feel looser post manipulation      Procedures:  CMT:  43832 Chiropractic manipulative treatment 3-4 regions performed   Cervical: Activator, C6, C7 , Prone  Thoracic: Activator, T1, T2, T6, T7, T8, Prone  Lumbar: Activator, L4, L5, Prone  Pelvis: Drop Table, PSIS Right , Prone    Modalities:  75515: IASTM: To Piriformis and Traps for 5 min    Therapeutic procedures:  None    Response to Treatment  Reduction in symptoms as reported by  patient    Prognosis: Good    Progress towards Goals: Patient is making progress towards the goal.     Recommendations:    Instructions:  ice 20 minutes every other hour as needed    Follow-up:    Return to care in one week  Follow up with PT.

## 2019-11-20 ENCOUNTER — THERAPY VISIT (OUTPATIENT)
Dept: CHIROPRACTIC MEDICINE | Facility: CLINIC | Age: 54
End: 2019-11-20
Payer: COMMERCIAL

## 2019-11-20 DIAGNOSIS — M62.838 SPASM OF MUSCLE: ICD-10-CM

## 2019-11-20 DIAGNOSIS — M99.02 THORACIC SEGMENT DYSFUNCTION: ICD-10-CM

## 2019-11-20 DIAGNOSIS — M99.01 CERVICAL SEGMENT DYSFUNCTION: ICD-10-CM

## 2019-11-20 DIAGNOSIS — M54.50 LUMBAGO: ICD-10-CM

## 2019-11-20 DIAGNOSIS — M99.05 SEGMENTAL DYSFUNCTION OF PELVIC REGION: Primary | ICD-10-CM

## 2019-11-20 DIAGNOSIS — M54.2 CERVICALGIA: ICD-10-CM

## 2019-11-20 DIAGNOSIS — M99.03 SEGMENTAL DYSFUNCTION OF LUMBAR REGION: ICD-10-CM

## 2019-11-20 PROCEDURE — 98941 CHIROPRACT MANJ 3-4 REGIONS: CPT | Mod: AT | Performed by: CHIROPRACTOR

## 2019-11-20 NOTE — PROGRESS NOTES
Visit #:  14    Subjective:  Betina Tinsley is a 54 year old female who is seen in f/u up for:        Segmental dysfunction of pelvic region  Lumbago  Segmental dysfunction of lumbar region  Spasm of muscle  Thoracic segment dysfunction  Cervicalgia  Cervical segment dysfunction.     Since last visit on 11/11/2019,  Betina Tinsley reports:    Area of chief complaint:  Cervical and Lumbar :  Symptoms are graded at 1/10. The quality is described as stiff, achey, dull.  Motion has improved but not normal. Patient feels that they are still feeling pain when getting up from a seated position. Her neck is feeling better but is still a little stiff and sore. She reports that she continue to perform her assigned exercises and will be seeing PT next on 12/4    Objective:  The following was observed:    P: palpatory tenderness Piriformis and Traps   A: static palpation demonstrates intersegmental asymmetry , cervical, thoracic, lumbar, pelvis  R: motion palpation notes restricted motion, C6 , C7 , T1 , T2 , T6 , T7 , T8 , L4 , L5  and PSIS Right   T: hypertonicity at: Piriformis and Traps     Segmental spinal dysfunction/restrictions found at:  C6 , C7 , T1 , T2 , T6 , T7 , T8 , L4 , L5  and PSIS Right       Assessment:    Diagnoses:      1. Segmental dysfunction of pelvic region    2. Lumbago    3. Segmental dysfunction of lumbar region    4. Spasm of muscle    5. Thoracic segment dysfunction    6. Cervicalgia    7. Cervical segment dysfunction        Patient's condition:  Patient had restrictions pre-manipulation    Treatment effectiveness:  Post manipulation there is better intersegmental movement and Patient claims to feel looser post manipulation      Procedures:  CMT:  65993 Chiropractic manipulative treatment 3-4 regions performed   Cervical: Activator, C6, C7 , Prone  Thoracic: Activator, T1, T2, T6, T7, T8, Prone  Lumbar: Activator, L4, L5, Prone  Pelvis: Drop Table, PSIS Right , Prone    Modalities:  87893: IASTM:  To Piriformis and Traps for 5 min    Therapeutic procedures:  None    Response to Treatment  Reduction in symptoms as reported by patient    Prognosis: Good    Progress towards Goals: Patient is making progress towards the goal.     Recommendations:    Instructions:  ice 20 minutes every other hour as needed    Follow-up:    Return to care in one week  Follow up with PT.

## 2019-12-04 ENCOUNTER — THERAPY VISIT (OUTPATIENT)
Dept: PHYSICAL THERAPY | Facility: CLINIC | Age: 54
End: 2019-12-04
Payer: COMMERCIAL

## 2019-12-04 DIAGNOSIS — M54.50 CHRONIC BILATERAL LOW BACK PAIN WITHOUT SCIATICA: ICD-10-CM

## 2019-12-04 DIAGNOSIS — G89.29 CHRONIC BILATERAL LOW BACK PAIN WITHOUT SCIATICA: ICD-10-CM

## 2019-12-04 PROCEDURE — 97110 THERAPEUTIC EXERCISES: CPT | Mod: GP | Performed by: PHYSICAL THERAPIST

## 2019-12-04 ASSESSMENT — ACTIVITIES OF DAILY LIVING (ADL)
STEPPING_UP_AND_DOWN_CURBS: MODERATE DIFFICULTY
PUTTING_ON_SOCKS_AND_SHOES: SLIGHT DIFFICULTY
HEAVY_WORK: MODERATE DIFFICULTY
LIGHT_TO_MODERATE_WORK: NO DIFFICULTY AT ALL
GETTING_INTO_AND_OUT_OF_AN_AVERAGE_CAR: MODERATE DIFFICULTY
WALKING_DOWN_STEEP_HILLS: SLIGHT DIFFICULTY
GOING_UP_1_FLIGHT_OF_STAIRS: EXTREME DIFFICULTY
GOING_DOWN_1_FLIGHT_OF_STAIRS: EXTREME DIFFICULTY
HOS_ADL_HIGHEST_POTENTIAL_SCORE: 64
WALKING_15_MINUTES_OR_GREATER: NO DIFFICULTY AT ALL
HOS_ADL_COUNT: 16
WALKING_INITIALLY: SLIGHT DIFFICULTY
ROLLING_OVER_IN_BED: MODERATE DIFFICULTY
SITTING_FOR_15_MINUTES: NO DIFFICULTY AT ALL
DEEP_SQUATTING: UNABLE TO DO
WALKING_APPROXIMATELY_10_MINUTES: NO DIFFICULTY AT ALL
HOS_ADL_ITEM_SCORE_TOTAL: 40
STANDING_FOR_15_MINUTES: MODERATE DIFFICULTY
HOS_ADL_SCORE(%): 62.5
TWISTING/PIVOTING_ON_INVOLVED_LEG: NO DIFFICULTY AT ALL
GETTING_INTO_AND_OUT_OF_A_BATHTUB: SLIGHT DIFFICULTY
WALKING_UP_STEEP_HILLS: SLIGHT DIFFICULTY

## 2019-12-04 NOTE — PROGRESS NOTES
"Subjective:  HPI                    Objective:  System    Physical Exam    General     ROS    Assessment/Plan:    DISCHARGE REPORT    Progress reporting period is from 02/26/2019 to 12/04/2019.       SUBJECTIVE  Subjective: \"I'm doing better with the exercises.\"  Pt states she can still feel unsteady on her feet but no falls.  HEP is \"tiring\" but not painful.  Can still be achy but not as much as there used to be.    Current Pain level: (not rated numerically).     Initial Pain level: 7/10.   Changes in function:  Yes (See Goal flowsheet attached for changes in current functional level)  Adverse reaction to treatment or activity: None    OBJECTIVE  Objective: Pt ambulates without device without gross instability.  \"Ache\" with standing lumbar flexion and B SG, relieved with extension.  Steady standing balance without perturbation.     ASSESSMENT/PLAN  Updated problem list and treatment plan: Diagnosis 1:  Back pain -- see plan below  STG/LTGs have been met or progress has been made towards goals:  Yes (See Goal flow sheet completed today.)  Assessment of Progress: The patient's condition has potential to improve.  Self Management Plans:  Patient is independent in a home treatment program.  I have re-evaluated this patient and find that the nature, scope, duration and intensity of the therapy is appropriate for the medical condition of the patient.  Betina continues to require the following intervention to meet STG and LTG's: see plan below    Recommendations:  Pleased that pt has noticed feeling more comfortable and improved function with increased consistency on HEP.  Has made good functional gains since starting PT with this.  Progress throughout had been limited by inconsistency with HEP and attendance in clinic.  However, pt noting ongoing dizziness and intermittent difficulty on her feet.  Included some vestibular work within this course of PT but at this point it has become her primary complaint with the " reduction in overall pain.  She has had some advanced imaging but would recommend further evaluation with consideration of PT including greater vestibular focus, if indicated.    Please refer to the daily flowsheet for treatment today, total treatment time and time spent performing 1:1 timed codes.

## 2019-12-04 NOTE — LETTER
"[facility]  [address]  [place] [address]  [phone]    2019    Re: Betina Tinsley   :   1965  MRN:  [id number]   REFERRING PHYSICIAN:   Gwendolyn ROUSEM [place] [facility]    Date of Initial Evaluation: 19  Visits:  Rxs Used: 8  Reason for Referral:  Chronic bilateral low back pain without sciatica    DISCHARGE REPORT    Progress reporting period is from 2019 to 2019.       SUBJECTIVE  Subjective: \"I'm doing better with the exercises.\"  Pt states she can still feel unsteady on her feet but no falls.  HEP is \"tiring\" but not painful.  Can still be achy but not as much as there used to be.    Current Pain level: (not rated numerically).     Initial Pain level: 7/10.   Changes in function:  Yes (See Goal flowsheet attached for changes in current functional level)  Adverse reaction to treatment or activity: None    OBJECTIVE  Objective: Pt ambulates without device without gross instability.  \"Ache\" with standing lumbar flexion and B SG, relieved with extension.  Steady standing balance without perturbation.     ASSESSMENT/PLAN  Updated problem list and treatment plan: Diagnosis 1:  Back pain -- see plan below  STG/LTGs have been met or progress has been made towards goals:  Yes (See Goal flow sheet completed today.)  Assessment of Progress: The patient's condition has potential to improve.  Self Management Plans:  Patient is independent in a home treatment program.  I have re-evaluated this patient and find that the nature, scope, duration and intensity of the therapy is appropriate for the medical condition of the patient.  Betina continues to require the following intervention to meet STG and LTG's: see plan below    Recommendations:  Pleased that pt has noticed feeling more comfortable and improved function with increased consistency on HEP.  Has made good functional gains since starting PT with this.  Progress throughout had been limited " by inconsistency with HEP and attendance in clinic.  However, pt noting ongoing dizziness and intermittent difficulty on her feet.  I  Betina Tinsley   :   1965        Included some vestibular work within this course of PT but at this point it has become her primary complaint with the reduction in overall pain.  She has had some advanced imaging but would recommend further evaluation with consideration of PT including greater vestibular focus, if indicated.              Thank you for your referral.    INQUIRIES  Therapist: Tahir Hoffman, PT, ATC, CSCS  [facility] [place] [facility]  [address]  [place] [address]  Phone: [phone]  Fax: [phone]

## 2019-12-10 ENCOUNTER — THERAPY VISIT (OUTPATIENT)
Dept: CHIROPRACTIC MEDICINE | Facility: CLINIC | Age: 54
End: 2019-12-10
Payer: COMMERCIAL

## 2019-12-10 DIAGNOSIS — M99.02 THORACIC SEGMENT DYSFUNCTION: ICD-10-CM

## 2019-12-10 DIAGNOSIS — M99.01 CERVICAL SEGMENT DYSFUNCTION: ICD-10-CM

## 2019-12-10 DIAGNOSIS — M99.05 SEGMENTAL DYSFUNCTION OF PELVIC REGION: Primary | ICD-10-CM

## 2019-12-10 DIAGNOSIS — M99.03 SEGMENTAL DYSFUNCTION OF LUMBAR REGION: ICD-10-CM

## 2019-12-10 DIAGNOSIS — M62.838 SPASM OF MUSCLE: ICD-10-CM

## 2019-12-10 DIAGNOSIS — M54.50 LUMBAGO: ICD-10-CM

## 2019-12-10 DIAGNOSIS — M54.2 CERVICALGIA: ICD-10-CM

## 2019-12-10 PROCEDURE — 98941 CHIROPRACT MANJ 3-4 REGIONS: CPT | Mod: AT | Performed by: CHIROPRACTOR

## 2019-12-10 NOTE — PROGRESS NOTES
Visit #:  15    Subjective:  Betina Tinsley is a 54 year old female who is seen in f/u up for:        Segmental dysfunction of pelvic region  Lumbago  Segmental dysfunction of lumbar region  Spasm of muscle  Thoracic segment dysfunction  Cervicalgia  Cervical segment dysfunction.     Since last visit on 11/20/2019,  Betina Tinsley reports:    Area of chief complaint:  Cervical and Lumbar :  Symptoms are graded at 2/10. The quality is described as stiff, achey, dull.  Motion has improved but not normal. Patient feels that they are doing better but are still feeling some pain in her lower back.  She continues to do her pt exercises and overall she is feeling better.    Objective:  The following was observed:    P: palpatory tenderness Piriformis and Traps   A: static palpation demonstrates intersegmental asymmetry , cervical, thoracic, lumbar, pelvis  R: motion palpation notes restricted motion, C6 , C7 , T1 , T2 , T6 , T7 , T8 , L4 , L5  and PSIS Right   T: hypertonicity at: Piriformis and Traps     Segmental spinal dysfunction/restrictions found at:  C6 , C7 , T1 , T2 , T6 , T7 , T8 , L4 , L5  and PSIS Right       Assessment:    Diagnoses:      1. Segmental dysfunction of pelvic region    2. Lumbago    3. Segmental dysfunction of lumbar region    4. Spasm of muscle    5. Thoracic segment dysfunction    6. Cervicalgia    7. Cervical segment dysfunction        Patient's condition:  Patient had restrictions pre-manipulation    Treatment effectiveness:  Post manipulation there is better intersegmental movement and Patient claims to feel looser post manipulation      Procedures:  CMT:  67719 Chiropractic manipulative treatment 3-4 regions performed   Cervical: Activator, C6, C7 , Prone  Thoracic: Activator, T1, T2, T6, T7, T8, Prone  Lumbar: Activator, L4, L5, Prone  Pelvis: Drop Table, PSIS Right , Prone    Modalities:  64739: IASTM: To Piriformis and Traps for 5 min    Therapeutic procedures:  None    Response to  Treatment  Reduction in symptoms as reported by patient    Prognosis: Good    Progress towards Goals: Patient is making progress towards the goal.     Recommendations:    Instructions:  ice 20 minutes every other hour as needed    Follow-up:    Return to care if symptoms persist

## 2019-12-18 ENCOUNTER — THERAPY VISIT (OUTPATIENT)
Dept: CHIROPRACTIC MEDICINE | Facility: CLINIC | Age: 54
End: 2019-12-18
Payer: COMMERCIAL

## 2019-12-18 DIAGNOSIS — M99.05 SEGMENTAL DYSFUNCTION OF PELVIC REGION: Primary | ICD-10-CM

## 2019-12-18 DIAGNOSIS — M62.838 SPASM OF MUSCLE: ICD-10-CM

## 2019-12-18 DIAGNOSIS — M54.2 CERVICALGIA: ICD-10-CM

## 2019-12-18 DIAGNOSIS — M54.50 CHRONIC LOW BACK PAIN: ICD-10-CM

## 2019-12-18 DIAGNOSIS — M99.01 CERVICAL SEGMENT DYSFUNCTION: ICD-10-CM

## 2019-12-18 DIAGNOSIS — G89.29 CHRONIC LOW BACK PAIN: ICD-10-CM

## 2019-12-18 DIAGNOSIS — M99.03 SEGMENTAL DYSFUNCTION OF LUMBAR REGION: ICD-10-CM

## 2019-12-18 DIAGNOSIS — M99.02 THORACIC SEGMENT DYSFUNCTION: ICD-10-CM

## 2019-12-18 PROCEDURE — 98941 CHIROPRACT MANJ 3-4 REGIONS: CPT | Mod: AT | Performed by: CHIROPRACTOR

## 2020-01-08 DIAGNOSIS — E78.5 HYPERLIPIDEMIA LDL GOAL <100: ICD-10-CM

## 2020-01-08 NOTE — TELEPHONE ENCOUNTER
"Requested Prescriptions   Pending Prescriptions Disp Refills     simvastatin (ZOCOR) 10 MG tablet [Pharmacy Med Name: SIMVASTATIN 10 MG TABLET]  Last Written Prescription Date:  7/3/19  Last Fill Quantity: 90,  # refills: 1   Last office visit: 10/15/2018 with prescribing provider:  abdulaziz   Future Office Visit:     90 tablet 1     Sig: TAKE 1 TABLET BY MOUTH EVERYDAY AT BEDTIME       Statins Protocol Failed - 1/8/2020  2:33 AM        Failed - LDL on file in past 12 months     Recent Labs   Lab Test 10/30/17  0835   LDL 96             Failed - Recent (12 mo) or future (30 days) visit within the authorizing provider's specialty     Patient has had an office visit with the authorizing provider or a provider within the authorizing providers department within the previous 12 mos or has a future within next 30 days. See \"Patient Info\" tab in inbasket, or \"Choose Columns\" in Meds & Orders section of the refill encounter.              Passed - No abnormal creatine kinase in past 12 months     No lab results found.             Passed - Medication is active on med list        Passed - Patient is age 18 or older        Passed - No active pregnancy on record        Passed - No positive pregnancy test in past 12 months          "

## 2020-01-09 DIAGNOSIS — E78.5 HYPERLIPIDEMIA LDL GOAL <100: ICD-10-CM

## 2020-01-09 RX ORDER — SIMVASTATIN 10 MG
TABLET ORAL
Qty: 90 TABLET | Refills: 1 | OUTPATIENT
Start: 2020-01-09

## 2020-01-09 NOTE — TELEPHONE ENCOUNTER
Pt's PCP is Gwendolyn Nielsen MD at [facility].    Shelley LIVINGSTON RN

## 2020-02-19 NOTE — PROGRESS NOTES
Subjective:  HPI                    Objective:  System    Physical Exam    General     ROS    Assessment/Plan:    DISCHARGE REPORT    Progress reporting period is from 02/26/2019 to 05/28/2019.   Patient has not returned to therapy so current subjective and objective findings are unknown.  The subjective and objective information are from the last visit (03/26/2019) with this patient.    SUBJECTIVE  Subjective: Pt reports she generally does well once she gets going.  Can be sore if not moving or stepping down curbs.  Has found standing extension to feel quite good, although hasn't tried doing it sitting (as was discussed last session).   Current Pain level: 4/10   Initial Pain level: 7/10     OBJECTIVE  Objective: Pain behaviors sit to stand but pt also had not done extension prior to standing.      ASSESSMENT/PLAN  Updated problem list and treatment plan: home program  STG/LTGs have been met or progress has been made towards goals:  N/A  Assessment of Progress: The patient has not returned to therapy. Current status is unknown.  Self Management Plans:  Patient has been instructed in a home treatment program.  Betina continues to require the following intervention to meet STG and LTG's: PT intervention is no longer required to meet STG/LTG.    Recommendations:  Pt last seen in PT 03/26/2019.  She has since no showed and cancelled with no further PT scheduled.  Discharge to [facility].   There are no preventive care reminders to display for this patient.    Patient is up to date, no discussion needed.    Vaccine Information Statement(s) was given today. This has been reviewed, questions answered, and verbal consent given by Parent for injection(s) and administration of Hepatitis A, Haemophilus Influenza type b (Hib) and Influenza (Inactivated).    1. Does the patient have a moderate to severe fever?  No  2. Has the patient had a serious reaction to a flu shot before?   No  3. Has the patient ever had Guillian [place] Syndrome within 6 weeks of a previous flu shot?  No  4. Is the patient less than 6 months of age?  No    Patient is eligible to receive the vaccine based on all questions being answered as 'No'.    Patient tolerated without incident. See immunization grid for documentation.    ASQ-3 Screen    Results: Some Concerns/Monitor           Communication: Monitoring Score:  20   Gross Motor: Reassuring Score:  60   Fine Motor: Reassuring Score:  60   Problem Solving: Reassuring Score:  45   Personal-Social: Reassuring Score:  50

## 2020-07-22 ENCOUNTER — THERAPY VISIT (OUTPATIENT)
Dept: CHIROPRACTIC MEDICINE | Facility: CLINIC | Age: 55
End: 2020-07-22
Payer: COMMERCIAL

## 2020-07-22 DIAGNOSIS — R20.0 FINGER NUMBNESS: ICD-10-CM

## 2020-07-22 DIAGNOSIS — M99.03 SEGMENTAL DYSFUNCTION OF LUMBAR REGION: ICD-10-CM

## 2020-07-22 DIAGNOSIS — M99.02 THORACIC SEGMENT DYSFUNCTION: ICD-10-CM

## 2020-07-22 DIAGNOSIS — M62.838 SPASM OF MUSCLE: ICD-10-CM

## 2020-07-22 DIAGNOSIS — M99.01 CERVICAL SEGMENT DYSFUNCTION: ICD-10-CM

## 2020-07-22 DIAGNOSIS — M99.05 SEGMENTAL DYSFUNCTION OF PELVIC REGION: Primary | ICD-10-CM

## 2020-07-22 DIAGNOSIS — M54.50 LUMBAGO: ICD-10-CM

## 2020-07-22 DIAGNOSIS — M99.07 SOMATIC DYSFUNCTION OF UPPER EXTREMITIES: ICD-10-CM

## 2020-07-22 DIAGNOSIS — M54.2 NECK ACHE: ICD-10-CM

## 2020-07-22 PROCEDURE — 99213 OFFICE O/P EST LOW 20 MIN: CPT | Mod: 25 | Performed by: CHIROPRACTOR

## 2020-07-22 PROCEDURE — 98943 CHIROPRACT MANJ XTRSPINL 1/>: CPT | Performed by: CHIROPRACTOR

## 2020-07-22 PROCEDURE — 98941 CHIROPRACT MANJ 3-4 REGIONS: CPT | Mod: AT | Performed by: CHIROPRACTOR

## 2020-08-05 ENCOUNTER — THERAPY VISIT (OUTPATIENT)
Dept: CHIROPRACTIC MEDICINE | Facility: CLINIC | Age: 55
End: 2020-08-05
Payer: COMMERCIAL

## 2020-08-05 DIAGNOSIS — M54.2 CERVICALGIA: ICD-10-CM

## 2020-08-05 DIAGNOSIS — M99.02 THORACIC SEGMENT DYSFUNCTION: ICD-10-CM

## 2020-08-05 DIAGNOSIS — M62.838 SPASM OF MUSCLE: ICD-10-CM

## 2020-08-05 DIAGNOSIS — M99.01 CERVICAL SEGMENT DYSFUNCTION: Primary | ICD-10-CM

## 2020-08-05 PROCEDURE — 98940 CHIROPRACT MANJ 1-2 REGIONS: CPT | Mod: AT | Performed by: CHIROPRACTOR

## 2020-08-05 NOTE — PROGRESS NOTES
Visit #:  2 of 6, based on treatment plan    Subjective:  Betina Tinsley is a 55 year old female who is seen in f/u up for:        Cervical segment dysfunction  Cervicalgia  Thoracic segment dysfunction  Spasm of muscle.     Since last visit on 7/22/2020,  Betina Tinsley reports the following changes: Pain immediately after last treatment: 3/10 and their pain level today 2/10.  Betina reports that her low back is feeling better.  Her neck is still a little stiff and sore. She has been having some headaches and her little finger is still numb.  Overall she is feeling improved    Area of chief complaint:  Cervical :  Symptoms are graded at 2/10. The quality is described as stiff, achey, dull.  Motion has increased, but is still not normal. Patient feels that they are improved due to a reduction in symptoms.        Objective:  The following was observed:    P: palpatory tendernessTraps     A: static palpation demonstrates intersegmental asymmetry , cervical, thoracic    R: motion palpation notes restricted motion, C1 , C6 , C7 , T1  and T2     T: The following soft tissue hypotonicities were observed:  Traps: bilateral, ache, dull pain and stiff, referred pain: no      Assessment:    Segmental spinal dysfunction/restrictions found at:  C1   C6   C7   T1   T2     Diagnoses:      1. Cervical segment dysfunction    2. Cervicalgia    3. Thoracic segment dysfunction    4. Spasm of muscle        Patient's condition:  Patient had restrictions pre-manipulation    Treatment effectiveness:  Post manipulation there is better intersegmental movement and Patient claims to feel looser post manipulation      Procedures:  CMT:  54811 Chiropractic manipulative treatment 1-2 regions performed   Cervical: Activator, C1 , C6, C7 , Prone  Thoracic: Activator, T1, T2, Prone    Modalities:  21151: MSTM:  To Traps  for 5 min    Therapeutic procedures:  None      Prognosis: Good    Progress towards Goals: Patient is making progress towards the  goal     Response to Treatment:   Reduction in symptoms as reported by patient      Recommendations:    Instructions:  ice 20 minutes every other hour as needed    Follow-up:   Return to care in one week

## 2020-12-16 ENCOUNTER — THERAPY VISIT (OUTPATIENT)
Dept: CHIROPRACTIC MEDICINE | Facility: CLINIC | Age: 55
End: 2020-12-16
Payer: COMMERCIAL

## 2020-12-16 DIAGNOSIS — M54.2 CERVICALGIA: ICD-10-CM

## 2020-12-16 DIAGNOSIS — M99.05 SEGMENTAL DYSFUNCTION OF PELVIC REGION: Primary | ICD-10-CM

## 2020-12-16 DIAGNOSIS — M99.02 THORACIC SEGMENT DYSFUNCTION: ICD-10-CM

## 2020-12-16 DIAGNOSIS — M99.03 SEGMENTAL DYSFUNCTION OF LUMBAR REGION: ICD-10-CM

## 2020-12-16 DIAGNOSIS — M62.838 SPASM OF MUSCLE: ICD-10-CM

## 2020-12-16 DIAGNOSIS — M54.50 BILATERAL LOW BACK PAIN WITHOUT SCIATICA, UNSPECIFIED CHRONICITY: ICD-10-CM

## 2020-12-16 DIAGNOSIS — M99.01 CERVICAL SEGMENT DYSFUNCTION: ICD-10-CM

## 2020-12-16 PROCEDURE — 98941 CHIROPRACT MANJ 3-4 REGIONS: CPT | Mod: AT | Performed by: CHIROPRACTOR

## 2020-12-16 NOTE — PROGRESS NOTES
Visit #:  2 in 2020     Subjective:  Betina Tinsley is a 55 year old female who is seen in f/u up for:        Segmental dysfunction of pelvic region  Bilateral low back pain without sciatica, unspecified chronicity  Segmental dysfunction of lumbar region  Spasm of muscle  Thoracic segment dysfunction  Cervicalgia  Cervical segment dysfunction.     Since last visit on 8/5/2020,  Betina Tinsley reports the following changes: Pain immediately after last treatment: 1/10 and their pain level today 6/10.  Betina reports that she has been having some low back pain for a few weeks now and has had some neck pain/stiffness as well.  There has been not trauma of late and she feels this is due to working from home and not having a ergonomically setup work space.    Area of chief complaint:  Cervical and Lumbar :  Symptoms are graded at 6/10. The quality is described as stiff, achey, dull.  Motion has decreased of the past month. Patient feels that they were doing weel and then symptoms began about a month ago..        Objective:  The following was observed:    P: palpatory tendernessPiriformis and Traps     A: static palpation demonstrates intersegmental asymmetry , cervical, thoracic, lumbar, pelvis    R: motion palpation notes restricted motion, C1 , C6 , C7 , T1 , T2 , L4 , L5  and PSIS Right     T: The following soft tissue hypotonicities were observed:  Piriformis: right, ache, dull pain and stiff, referred pain: no  Traps: bilateral, ache, dull pain and stiff, referred pain: no      Assessment:    Segmental spinal dysfunction/restrictions found at:  C1   C6   C7   T1   T2   L4  L5  PSIS Right    Diagnoses:      1. Segmental dysfunction of pelvic region    2. Bilateral low back pain without sciatica, unspecified chronicity    3. Segmental dysfunction of lumbar region    4. Spasm of muscle    5. Thoracic segment dysfunction    6. Cervicalgia    7. Cervical segment dysfunction        Patient's condition:  Patient had  restrictions pre-manipulation    Treatment effectiveness:  Post manipulation there is better intersegmental movement and Patient claims to feel looser post manipulation      Procedures:  CMT:  05824 Chiropractic manipulative treatment 3-4 regions performed   Cervical: Activator, C1 , C6, C7 , Prone  Thoracic: Activator, T1, T2, Prone  Lumbar: Activator, L4, L5, Prone  Pelvis: Drop Table, PSIS Right , Prone    Modalities:  27949: MSTM:  To Lumbar erector spine, Piriformis and Traps  for 5 min    Therapeutic procedures:  None      Prognosis: Good    Progress towards Goals: Patient is making progress towards the goal     Response to Treatment:   Reduction in symptoms as reported by patient      Recommendations:    Instructions:  ice 20 minutes every other hour as needed    Follow-up:   Return to care in one week

## 2020-12-22 ENCOUNTER — THERAPY VISIT (OUTPATIENT)
Dept: CHIROPRACTIC MEDICINE | Facility: CLINIC | Age: 55
End: 2020-12-22
Payer: COMMERCIAL

## 2020-12-22 DIAGNOSIS — M99.01 CERVICAL SEGMENT DYSFUNCTION: ICD-10-CM

## 2020-12-22 DIAGNOSIS — M54.50 BILATERAL LOW BACK PAIN WITHOUT SCIATICA, UNSPECIFIED CHRONICITY: ICD-10-CM

## 2020-12-22 DIAGNOSIS — M99.02 THORACIC SEGMENT DYSFUNCTION: ICD-10-CM

## 2020-12-22 DIAGNOSIS — M54.2 CERVICALGIA: ICD-10-CM

## 2020-12-22 DIAGNOSIS — M99.03 SEGMENTAL DYSFUNCTION OF LUMBAR REGION: ICD-10-CM

## 2020-12-22 DIAGNOSIS — M62.838 SPASM OF MUSCLE: ICD-10-CM

## 2020-12-22 DIAGNOSIS — M99.05 SEGMENTAL DYSFUNCTION OF PELVIC REGION: Primary | ICD-10-CM

## 2020-12-22 PROCEDURE — 98941 CHIROPRACT MANJ 3-4 REGIONS: CPT | Mod: AT | Performed by: CHIROPRACTOR

## 2020-12-22 NOTE — PROGRESS NOTES
Visit #:  2 in 2020     Subjective:  Betina Tinsley is a 55 year old female who is seen in f/u up for:        Segmental dysfunction of pelvic region  Bilateral low back pain without sciatica, unspecified chronicity  Segmental dysfunction of lumbar region  Spasm of muscle  Thoracic segment dysfunction  Cervicalgia  Cervical segment dysfunction.     Since last visit on 8/5/2020,  Betina Tinsley reports the following changes: Pain immediately after last treatment: 6/10 and their pain level today 3/10.  Betina reports that she is feeling better but her low back is still stiff and sore when she sits for to long.  Additionally, she is have some neck discomfort and is a little headachy.      Area of chief complaint:  Cervical and Lumbar :  Symptoms are graded at 3/10. The quality is described as stiff, achey, dull.  Motion has increased but not normal. Patient feels that they improved.but not normal     Objective:  The following was observed:    P: palpatory tendernessPiriformis and Traps     A: static palpation demonstrates intersegmental asymmetry , cervical, thoracic, lumbar, pelvis    R: motion palpation notes restricted motion, C1 , C6 , C7 , T1 , T2 , L4 , L5  and PSIS Right     T: The following soft tissue hypotonicities were observed:  Piriformis: right, ache, dull pain and stiff, referred pain: no  Traps: bilateral, ache, dull pain and stiff, referred pain: no      Assessment:    Segmental spinal dysfunction/restrictions found at:  C1   C6   C7   T1   T2   L4  L5  PSIS Right    Diagnoses:      1. Segmental dysfunction of pelvic region    2. Bilateral low back pain without sciatica, unspecified chronicity    3. Segmental dysfunction of lumbar region    4. Spasm of muscle    5. Thoracic segment dysfunction    6. Cervicalgia    7. Cervical segment dysfunction        Patient's condition:  Patient had restrictions pre-manipulation    Treatment effectiveness:  Post manipulation there is better intersegmental movement and  Patient claims to feel looser post manipulation      Procedures:  CMT:  23874 Chiropractic manipulative treatment 3-4 regions performed   Cervical: Activator, C1 , C6, C7 , Prone  Thoracic: Activator, T1, T2, Prone  Lumbar: Activator, L4, L5, Prone  Pelvis: Drop Table, PSIS Right , Prone    Modalities:  70826: MSTM:  To Lumbar erector spine, Piriformis and Traps  for 5 min    Therapeutic procedures:  None      Prognosis: Good    Progress towards Goals: Patient is making progress towards the goal     Response to Treatment:   Reduction in symptoms as reported by patient      Recommendations:    Instructions:  ice 20 minutes every other hour as needed    Follow-up:   Return to care if symptoms persist

## 2021-05-30 ENCOUNTER — RECORDS - HEALTHEAST (OUTPATIENT)
Dept: ADMINISTRATIVE | Facility: CLINIC | Age: 56
End: 2021-05-30

## 2023-03-10 ENCOUNTER — TRANSCRIBE ORDERS (OUTPATIENT)
Dept: OTHER | Age: 58
End: 2023-03-10

## 2023-03-10 DIAGNOSIS — N39.46 URINARY INCONTINENCE, MIXED: Primary | ICD-10-CM

## 2023-03-10 DIAGNOSIS — N39.46 MIXED INCONTINENCE: ICD-10-CM

## 2023-04-24 ENCOUNTER — THERAPY VISIT (OUTPATIENT)
Dept: PHYSICAL THERAPY | Facility: CLINIC | Age: 58
End: 2023-04-24
Attending: UROLOGY
Payer: COMMERCIAL

## 2023-04-24 DIAGNOSIS — N39.3 FEMALE STRESS INCONTINENCE: ICD-10-CM

## 2023-04-24 DIAGNOSIS — N81.89 PELVIC FLOOR WEAKNESS IN FEMALE: ICD-10-CM

## 2023-04-24 DIAGNOSIS — K59.00 CONSTIPATION: ICD-10-CM

## 2023-04-24 PROCEDURE — 97112 NEUROMUSCULAR REEDUCATION: CPT | Mod: GP | Performed by: PHYSICAL THERAPIST

## 2023-04-24 PROCEDURE — 97110 THERAPEUTIC EXERCISES: CPT | Mod: GP | Performed by: PHYSICAL THERAPIST

## 2023-04-24 PROCEDURE — 97161 PT EVAL LOW COMPLEX 20 MIN: CPT | Mod: GP | Performed by: PHYSICAL THERAPIST

## 2023-04-24 PROCEDURE — 97530 THERAPEUTIC ACTIVITIES: CPT | Mod: GP | Performed by: PHYSICAL THERAPIST

## 2023-04-24 NOTE — PROGRESS NOTES
Physical Therapy Initial Evaluation  Subjective:  The history is provided by the patient. No  was used.   Therapist Generated HPI Evaluation  Problem details: Date of MD order for this episode was 3-8-23(Dr RACHAEL Velazquez). Betina presents today for bladder issues. She has lost about 18 lbs since November and this has helped.   Bladder-Betina states if she runs, or picks up anything, cough/sneeze/laugh she will have leakage. Goes through 2-3 pads/day and at night will wear a pantiliner(is damp to wet). Betina feels she has small leaks all day. Day voids are every 3-4 hrs, night up 1x to void. Stream is strong, feels empty after voiding, does have some post void dribble. No unusual urgency but is on oxybutinen and has been for awhile.   Fluids-40 oz/day, 1 coffee  Bowels-has had a lifetime of constipation, has a BM about 2x/wk. Type 3 on bristol stool chart with just starting on magnesium citrate. She is trying to eat more fiber. Does not usually feel empty after going.  Not aware of proper defecation positioning/pushing.   Fiber-trying to get more fiber into diet.  No sensation of prolapse  Pain-no lower abdominal, rectal, vaginal pain, no pain with intercourse  Exercise-has been walking 2-3x/wk for about 35 min  Betina gave permission for intravaginal exam today    .         Type of problem:  Incontinence and other (constipation).    This is a chronic condition.  Condition occurred with:  Insidious onset.  Where condition occurred: for unknown reasons.  Patient reports pain:  N/a.      Since onset symptoms are gradually worsening.  Symptoms are exacerbated by jumping, coughing, sneezing and laughing  and relieved by nothing.      Restrictions due to condition include:  Working in normal job without restrictions.  Barriers include:  None as reported by patient.    Patient Health History  Betina Tinsley being seen for incontinence.     Date of Onset: has had a very long time.   Problem occurred: unsure    Pain is reported as 0/10 on pain scale.  General health as reported by patient is good.  Pertinent medical history includes: overweight, high blood pressure, depression and migraines/headaches.   Red flags:  None as reported by patient.  Medical allergies: none.   Surgeries include:  Other (see epic).    Current medications:  Sleep medication, anti-depressants and high blood pressure medication.    Current occupation is .   Primary job tasks include:  Prolonged sitting and computer work.                                    Objective:  System                                 Pelvic Dysfunction Evaluation:        Flexibility:    Tightness present at:Adductors; Iliopsoas; Piriformis and Gluteals  Tightness not present at:  Hamstrings    Abdominal Wall:  Abdominal wall pelvic: more chest than diaphragmatic breather, central rib angle tightness, mild B LQ tightness and mild suprapubic tightness.        Pelvic Clock Exam:    Ischiocavernosis pain:  -  Bulbocavernosis pain:  -  Transverse Perineal:  -  Levator ANI:  -  Perineal Body:  -  SI Provocation:    Positive for: ASLR  Negative for:  SI Compression and Fabres        External Assessment:  External assessment pelvic: most activation anal area.  Skin Condition:  Normal      Tissue Symmetry:  Normal  Introitus:  Normal  Muscle Contraction/Perineal Mobility:  Slight lift, no urogential triangle descent  Internal Assessment:  Internal assessment pelvic: 3-/5 MMT, L LA and OI weaker than R and more difficult activating without verbal and tactile cues. 2 sec quick flicks very weak activation.  Sensory Exam:  Normal  Contraction/Grade:  Weak squeeze, 2 second hold (2)          Additional History:  Delivery History:  Vaginal delivery  Number of Pregnancies: 1  Number of Live Births: 1  Caffeine Consumption:  1 coffee          Hip Evaluation    Hip Strength:      Extension:  Left: 4/5  Pain:Right: 5/5    Pain:    Abduction:  Left: 3+/5     Pain:Right: 3+/5     Pain:    Internal Rotation:  Left: 5/5    Pain:Right: 5/5   Pain:  External Rotation:  Left: 4/5   Pain:  Right: 4/5   Pain:                           General     ROS    Assessment/Plan:    Patient is a 58 year old female with pelvic complaints.    Patient has the following significant findings with corresponding treatment plan.                Diagnosis 1:  Stress incontinence, pelvic floor weakness, constipation  Decreased ROM/flexibility - manual therapy, therapeutic exercise and home program  Decreased strength - therapeutic exercise, therapeutic activities and home program  Decreased proprioception - neuro re-education, therapeutic activities and home program  Impaired muscle performance - biofeedback, neuro re-education and home program  Decreased function - therapeutic activities and home program    Therapy Evaluation Codes:   1) History comprised of:   Personal factors that impact the plan of care:      Age, Past/current experiences and Time since onset of symptoms.    Comorbidity factors that impact the plan of care are:      None.     Medications impacting care: bladder med.  2) Examination of Body Systems comprised of:   Body structures and functions that impact the plan of care:      Pelvis.   Activity limitations that impact the plan of care are:      Stress incontinence and urgency controlled with med, constipation.  3) Clinical presentation characteristics are:   Stable/Uncomplicated.  4) Decision-Making    Low complexity using standardized patient assessment instrument and/or measureable assessment of functional outcome.  Cumulative Therapy Evaluation is: Low complexity.    Previous and current functional limitations:  (See Goal Flow Sheet for this information)    Short term and Long term goals: (See Goal Flow Sheet for this information)     Communication ability:  Patient appears to be able to clearly communicate and understand verbal and written communication and follow directions  correctly.  Treatment Explanation - The following has been discussed with the patient:   RX ordered/plan of care  Anticipated outcomes  Possible risks and side effects  This patient would benefit from PT intervention to resume normal activities.   Rehab potential is good.    Frequency:  2 X a month, once daily  Duration:  for 3 months  Discharge Plan:  Achieve all LTG.  Independent in home treatment program.  Reach maximal therapeutic benefit.    Please refer to the daily flowsheet for treatment today, total treatment time and time spent performing 1:1 timed codes.

## 2023-04-24 NOTE — LETTER
4/24/2023        RE: Betina Tinsley  [address] [place] [address]        Physical Therapy Initial Evaluation  Subjective:  The history is provided by the patient. No  was used.   Therapist Generated HPI Evaluation  Problem details: Date of MD order for this episode was 3-8-23(Dr RACHAEL Velazquez). Betina presents today for bladder issues. She has lost about 18 lbs since November and this has helped.   Bladder-Betina states if she runs, or picks up anything, cough/sneeze/laugh she will have leakage. Goes through 2-3 pads/day and at night will wear a pantiliner(is damp to wet). Betina feels she has small leaks all day. Day voids are every 3-4 hrs, night up 1x to void. Stream is strong, feels empty after voiding, does have some post void dribble. No unusual urgency but is on oxybutinen and has been for awhile.   Fluids-40 oz/day, 1 coffee  Bowels-has had a lifetime of constipation, has a BM about 2x/wk. Type 3 on bristol stool chart with just starting on magnesium citrate. She is trying to eat more fiber. Does not usually feel empty after going.  Not aware of proper defecation positioning/pushing.   Fiber-trying to get more fiber into diet.  No sensation of prolapse  Pain-no lower abdominal, rectal, vaginal pain, no pain with intercourse  Exercise-has been walking 2-3x/wk for about 35 min  Betina gave permission for intravaginal exam today     Type of problem:  Incontinence and other (constipation).  This is a chronic condition.  Condition occurred with:  Insidious onset.  Where condition occurred: for unknown reasons.  Patient reports pain:  N/a.  Since onset symptoms are gradually worsening.  Symptoms are exacerbated by jumping, coughing, sneezing and laughing  and relieved by nothing.  Restrictions due to condition include:  Working in normal job without restrictions.  Barriers include:  None as reported by patient.    Patient Health History  Betina Tinsley being seen for incontinence.      Date of Onset: has had a very long time.   Problem occurred: unsure   Pain is reported as 0/10 on pain scale.  General health as reported by patient is good.  Pertinent medical history includes: overweight, high blood pressure, depression and migraines/headaches.   Red flags:  None as reported by patient.  Medical allergies: none.   Surgeries include:  Other (see epic).    Current medications:  Sleep medication, anti-depressants and high blood pressure medication.    Current occupation is .   Primary job tasks include:  Prolonged sitting and computer work.                            Objective:  System    Pelvic Dysfunction Evaluation:      Flexibility:    Tightness present at:Adductors; Iliopsoas; Piriformis and Gluteals  Tightness not present at:  Hamstrings    Abdominal Wall:  Abdominal wall pelvic: more chest than diaphragmatic breather, central rib angle tightness, mild B LQ tightness and mild suprapubic tightness.    Pelvic Clock Exam:    Ischiocavernosis pain:  -  Bulbocavernosis pain:  -  Transverse Perineal:  -  Levator ANI:  -  Perineal Body:  -    SI Provocation:    Positive for: ASLR  Negative for:  SI Compression and Fabres    External Assessment:  External assessment pelvic: most activation anal area.  Skin Condition:  Normal  Tissue Symmetry:  Normal  Introitus:  Normal  Muscle Contraction/Perineal Mobility:  Slight lift, no urogential triangle descent  Internal Assessment:  Internal assessment pelvic: 3-/5 MMT, L LA and OI weaker than R and more difficult activating without verbal and tactile cues. 2 sec quick flicks very weak activation.  Sensory Exam:  Normal  Contraction/Grade:  Weak squeeze, 2 second hold (2)    Additional History:  Delivery History:  Vaginal delivery  Number of Pregnancies: 1  Number of Live Births: 1  Caffeine Consumption:  1 coffee          Hip Evaluation  Hip Strength:    Extension:  Left: 4/5  Pain:Right: 5/5    Pain:    Abduction:  Left: 3+/5     Pain:Right:  3+/5    Pain:  Internal Rotation:  Left: 5/5    Pain:Right: 5/5   Pain:  External Rotation:  Left: 4/5   Pain:  Right: 4/5   Pain:    Assessment/Plan:    Patient is a 58 year old female with pelvic complaints.    Patient has the following significant findings with corresponding treatment plan.                Diagnosis 1:  Stress incontinence, pelvic floor weakness, constipation  Decreased ROM/flexibility - manual therapy, therapeutic exercise and home program  Decreased strength - therapeutic exercise, therapeutic activities and home program  Decreased proprioception - neuro re-education, therapeutic activities and home program  Impaired muscle performance - biofeedback, neuro re-education and home program  Decreased function - therapeutic activities and home program    Therapy Evaluation Codes:   History comprised of:   Personal factors that impact the plan of care:      Age, Past/current experiences and Time since onset of symptoms.     Comorbidity factors that impact the plan of care are:      None.      Medications impacting care: bladder med.  Examination of Body Systems comprised of:    Body structures and functions that impact the plan of care:      Pelvis.    Activity limitations that impact the plan of care are:      Stress incontinence and urgency controlled with med, constipation.  Clinical presentation characteristics are:   Stable/Uncomplicated.  Decision-Making  Low complexity using standardized patient assessment instrument and/or measureable assessment of functional outcome.    Cumulative Therapy Evaluation is: Low complexity.  Previous and current functional limitations:  (See Goal Flow Sheet for this information)    Short term and Long term goals: (See Goal Flow Sheet for this information)   Communication ability:  Patient appears to be able to clearly communicate and understand verbal and written communication and follow directions correctly.  Treatment Explanation - The following has been  discussed with the patient:   RX ordered/plan of care  Anticipated outcomes  Possible risks and side effects  This patient would benefit from PT intervention to resume normal activities.   Rehab potential is good.    Frequency:  2 X a month, once daily  Duration:  for 3 months  Discharge Plan:  Achieve all LTG.  Independent in home treatment program.  Reach maximal therapeutic benefit.  Sincerely,    Sydni Dillard, PT

## 2023-05-15 ENCOUNTER — THERAPY VISIT (OUTPATIENT)
Dept: PHYSICAL THERAPY | Facility: CLINIC | Age: 58
End: 2023-05-15
Payer: COMMERCIAL

## 2023-05-15 DIAGNOSIS — N39.3 FEMALE STRESS INCONTINENCE: Primary | ICD-10-CM

## 2023-05-15 DIAGNOSIS — K59.00 CONSTIPATION, UNSPECIFIED CONSTIPATION TYPE: ICD-10-CM

## 2023-05-15 DIAGNOSIS — N81.89 PELVIC FLOOR WEAKNESS IN FEMALE: ICD-10-CM

## 2023-05-15 PROCEDURE — 97112 NEUROMUSCULAR REEDUCATION: CPT | Mod: GP | Performed by: PHYSICAL THERAPIST

## 2023-05-15 PROCEDURE — 97110 THERAPEUTIC EXERCISES: CPT | Mod: GP | Performed by: PHYSICAL THERAPIST

## 2023-05-15 PROCEDURE — 97140 MANUAL THERAPY 1/> REGIONS: CPT | Mod: GP | Performed by: PHYSICAL THERAPIST

## 2023-07-24 ENCOUNTER — THERAPY VISIT (OUTPATIENT)
Dept: PHYSICAL THERAPY | Facility: CLINIC | Age: 58
End: 2023-07-24
Payer: COMMERCIAL

## 2023-07-24 DIAGNOSIS — N39.3 FEMALE STRESS INCONTINENCE: Primary | ICD-10-CM

## 2023-07-24 DIAGNOSIS — K59.00 CONSTIPATION, UNSPECIFIED CONSTIPATION TYPE: ICD-10-CM

## 2023-07-24 DIAGNOSIS — N81.89 PELVIC FLOOR WEAKNESS IN FEMALE: ICD-10-CM

## 2023-07-24 PROCEDURE — 97140 MANUAL THERAPY 1/> REGIONS: CPT | Mod: GP | Performed by: PHYSICAL THERAPIST

## 2023-07-24 PROCEDURE — 97112 NEUROMUSCULAR REEDUCATION: CPT | Mod: GP | Performed by: PHYSICAL THERAPIST

## 2023-07-24 PROCEDURE — 97110 THERAPEUTIC EXERCISES: CPT | Mod: GP | Performed by: PHYSICAL THERAPIST

## 2023-07-24 NOTE — PROGRESS NOTES
"    PLAN  Continue therapy per current plan of care. Planning a recheck follow up in about 4 wks. If doing well, may discharge to [facility]   07/24/23 0500   Appointment Info   Signing clinician's name / credentials Sydni Dillard PT   Total/Authorized Visits plan 6   Visits Used 3   Medical Diagnosis stress incontinence, Pelvic floor weakness, constipation   PT Tx Diagnosis stress incontinence, Pelvic floor weakness, constipation   Quick Adds Pelvic Consent   Progress Note/Certification   Onset of illness/injury or Date of Surgery 03/08/23  (date if MD order LONI Velazquez)   Progress Note Due Date 07/24/23   Progress Note Completed Date 04/24/23  (eval)   PT Goal 1   Goal Identifier urinary leakage   Goal Description patient does not have any urinary leakage over one months time, day or night   Goal Progress leaks with sneeze 1x/wk(5-15 constant small drips throughout the day and 1 night limer which was damp)   Target Date 09/18/23   Subjective Report   Subjective Report Has not been in since 5-15-23.  has had some health issues(will be having surgery) and wanted to get more of HEP in. Overall states she is doing better. Using 1 pad/day(was 2 with leakage off and on all day) and does not feel wet all the time, roughly is dry all day. Does not think she is having any urinary leaks unless sneezing. About 1 leak/wk with sneezing. BM'a are getting better, using mag citrate and using 6 prunes/day. Having a BM \"little bits throughout the day\" type 4 on bristol stool chart. Not feeling fully empty but before was going only  every 7-8 days. Did not notice much change with squatty potty positioning.   Objective Measures   Objective Measures Objective Measure 1;Objective Measure 2   Objective Measure 1   Objective Measure PFM strength   Details 5-15  3-/5, current 3/5 though 2'' qucik flicks area still sluggish/slow   Objective Measure 2   Objective Measure endurance for a contraction   Details 8\" 5-15, 8-10 sec current. " "Control with elevator up and down is weak   Treatment Interventions (PT)   Interventions Therapeutic Procedure/Exercise;Neuromuscular Re-education;Manual Therapy   Therapeutic Procedure/Exercise   PTRx Ther Proc 1 Seated Piriformis Stretch   PTRx Ther Proc 1 - Details VR in clinic  2R each 15-30 sec hold   PTRx Ther Proc 2 Supine Pelvic Floor Muscle Strengthening   PTRx Ther Proc 2 - Details VR in clinic for HEP  5R quick flicks, 5R 8-10 sec hold with at least 10\" rest between each, 4 sessions//day   PTRx Ther Proc 3 Bridging #2B Straight leg   PTRx Ther Proc 3 - Details 1s 8R B with form cues HEP 3-5x/wk   PTRx Ther Proc 4 Supine Abdominal Exercise #5 (Arm and Leg Extension)   PTRx Ther Proc 4 - Details #4 5R #1 is easy #5 10R with cues for form HEP 3-5x/wk   PTRx Ther Proc 5 Sidestep   PTRx Ther Proc 5 - Details grn band around knees, shelley PFM every 5 steps to fatigue, HEP 3-5x/wk, also discussed shelley PFM with asc and desc steps   Therapeutic Procedures: strength, endurance, ROM, flexibillity minutes (46525) 15   Skilled Intervention selection, implementation and cues with exercises   Patient Response/Progress kit well   Therapeutic Activity   PTRx Ther Act 1 Diaphragmatic Breathing   PTRx Ther Act 1 - Details cues throughout session HEP daily   PTRx Ther Act 2 Abdominal Massage   PTRx Ther Act 2 - Details at eval   PTRx Ther Act 3 Proper Defecation Techniques   PTRx Ther Act 3 - Details at eval   PTRx Ther Act 4 Toileting Position   PTRx Ther Act 4 - Details at eval   PTRx Ther Act 5 Functions of Fiber   PTRx Ther Act 5 - Details at eval   PTRx Ther Act 6 Normal Bowel Habits   PTRx Ther Act 6 - Details at eval   Neuromuscular Re-education   Neuromuscular re-ed of mvmt, balance, coord, kinesthetic sense, posture, proprioception minutes (15432) 13   Neuromuscular Re-education Neuro Re-ed 2   Neuro Re-ed 1 intravaginal NMR   Neuro Re-ed 1 - Details pressure tap and stretch to improve PFM activation B " LA OI phasic and tonic activation   Skilled Intervention VC TC   Patient Response/Progress kit well   Neuro Re-ed 2 elevator   Neuro Re-ed 2 - Details upx3, downx3 combo x2 with cues throughout, weak activation with control   Manual Therapy   Manual Therapy: Mobilization, MFR, MLD, friction massage minutes (71318) 10   Manual Therapy 1 abdominal MFR and constipation massage to improve stool motilty   Manual Therapy 1 - Details B LQ, inf ribs due to MF tightness, constipation massage by PT and review for pt(to be consistent for 3 wks)-had not been doing   Skilled Intervention selection and implementation of techniques   Patient Response/Progress kit well   Intervention (Other)   PTRx Other  1 Pelvic Floor Anatomy and Function   PTRx Other 1 - Details at [facility]   PTRx Other  2 General Bladder Information   PTRx Other 2 - Details at [facility]   PTRx Other  3 Urge Incontinence Suppression Techniques   PTRx Other 3 - Details at [facility]   Education   Learner/Method Patient;Listening;Demonstration;Pictures/Video   Education Comments printed ptrx   Plan   Home program ptrx   Plan for next session recheck and may finish up with PT, check PF strength, proprioception and core   Comments   Pelvic Health Informed Consent Statement Discussed with patient/guardian reason for referral regarding pelvic health needs and external/internal pelvic floor muscle examination.  Opportunity provided to ask questions and verbal consent for assessment and intervention was given.   Total Session Time   Timed Code Treatment Minutes 38   Total Treatment Time (sum of timed and untimed services) 38       Beginning/End Dates of Progress Note Reporting Period:  04/24/23 ([facility]) to 07/24/2023    Referring Provider:  Chel Velazquez

## 2023-10-04 PROBLEM — K59.00 CONSTIPATION: Status: RESOLVED | Noted: 2023-04-24 | Resolved: 2023-10-04

## 2023-10-04 PROBLEM — N39.3 FEMALE STRESS INCONTINENCE: Status: RESOLVED | Noted: 2023-04-24 | Resolved: 2023-10-04

## 2023-10-04 PROBLEM — N81.89 PELVIC FLOOR WEAKNESS IN FEMALE: Status: RESOLVED | Noted: 2023-04-24 | Resolved: 2023-10-04

## 2023-10-04 NOTE — PROGRESS NOTES
DISCHARGE SUMMARY    Betina Tinsley was seen  3 times for evaluation and treatment.  Was most likely planning to discharge after next visit(cancelled)  Please see goal flow sheet from episode noted date below and initial evaluation for further information.  Patient is discharged from therapy and therapy episode is resolved as of 10/04/23.      Linked Episodes   Type: Episode: Status: Noted: Resolved: Last update: Updated by:   PHYSICAL THERAPY pelvic floor 4-24-23 Active 4/24/2023 7/24/2023  3:10 PM Sydni Dillard, PT      Comments:

## 2024-10-18 ENCOUNTER — ANCILLARY PROCEDURE (OUTPATIENT)
Dept: GENERAL RADIOLOGY | Facility: CLINIC | Age: 59
End: 2024-10-18
Attending: NURSE PRACTITIONER
Payer: COMMERCIAL

## 2024-10-18 DIAGNOSIS — M25.552 LEFT HIP PAIN: ICD-10-CM

## 2024-10-18 PROCEDURE — 73502 X-RAY EXAM HIP UNI 2-3 VIEWS: CPT | Mod: TC | Performed by: INTERNAL MEDICINE
